# Patient Record
Sex: FEMALE | Race: BLACK OR AFRICAN AMERICAN | NOT HISPANIC OR LATINO | Employment: OTHER | URBAN - METROPOLITAN AREA
[De-identification: names, ages, dates, MRNs, and addresses within clinical notes are randomized per-mention and may not be internally consistent; named-entity substitution may affect disease eponyms.]

---

## 2023-04-29 DIAGNOSIS — I10 ESSENTIAL HYPERTENSION: Primary | ICD-10-CM

## 2023-05-02 PROBLEM — F03.918 DEMENTIA WITH BEHAVIORAL DISTURBANCE (MULTI): Status: ACTIVE | Noted: 2023-05-02

## 2023-05-02 PROBLEM — G89.29 CHRONIC PAIN OF RIGHT KNEE: Status: ACTIVE | Noted: 2023-05-02

## 2023-05-02 PROBLEM — N64.4 BREAST PAIN IN FEMALE: Status: ACTIVE | Noted: 2023-05-02

## 2023-05-02 PROBLEM — M79.604 PAIN OF RIGHT LOWER EXTREMITY: Status: ACTIVE | Noted: 2023-05-02

## 2023-05-02 PROBLEM — F41.9 ANXIETY: Status: ACTIVE | Noted: 2023-05-02

## 2023-05-02 PROBLEM — M62.838 NECK MUSCLE SPASM: Status: ACTIVE | Noted: 2023-05-02

## 2023-05-02 PROBLEM — M79.662 PAIN OF LEFT LOWER LEG: Status: ACTIVE | Noted: 2023-05-02

## 2023-05-02 PROBLEM — M54.9 PAIN OF BACK AND LEFT LOWER EXTREMITY: Status: ACTIVE | Noted: 2023-05-02

## 2023-05-02 PROBLEM — G89.29 NECK PAIN, CHRONIC: Status: ACTIVE | Noted: 2023-05-02

## 2023-05-02 PROBLEM — M25.551 HIP PAIN, RIGHT: Status: ACTIVE | Noted: 2023-05-02

## 2023-05-02 PROBLEM — I50.9 CHF (CONGESTIVE HEART FAILURE) (MULTI): Status: ACTIVE | Noted: 2023-05-02

## 2023-05-02 PROBLEM — D64.9 ANEMIA: Status: ACTIVE | Noted: 2023-05-02

## 2023-05-02 PROBLEM — M54.50 LOW BACK PAIN RADIATING TO RIGHT LEG: Status: ACTIVE | Noted: 2023-05-02

## 2023-05-02 PROBLEM — K21.9 GERD (GASTROESOPHAGEAL REFLUX DISEASE): Status: ACTIVE | Noted: 2023-05-02

## 2023-05-02 PROBLEM — K59.09 CONSTIPATION, CHRONIC: Status: ACTIVE | Noted: 2023-05-02

## 2023-05-02 PROBLEM — M54.2 NECK PAIN, CHRONIC: Status: ACTIVE | Noted: 2023-05-02

## 2023-05-02 PROBLEM — M25.561 CHRONIC PAIN OF RIGHT KNEE: Status: ACTIVE | Noted: 2023-05-02

## 2023-05-02 PROBLEM — R41.3 MEMORY DEFICITS: Status: ACTIVE | Noted: 2023-05-02

## 2023-05-02 PROBLEM — M79.604 LOW BACK PAIN RADIATING TO RIGHT LEG: Status: ACTIVE | Noted: 2023-05-02

## 2023-05-02 PROBLEM — M79.89 SWELLING OF LOWER EXTREMITY: Status: ACTIVE | Noted: 2023-05-02

## 2023-05-02 PROBLEM — N20.0 MULTIPLE KIDNEY STONES: Status: ACTIVE | Noted: 2023-05-02

## 2023-05-02 PROBLEM — I10 ESSENTIAL HYPERTENSION: Status: ACTIVE | Noted: 2023-05-02

## 2023-05-02 PROBLEM — G44.009: Status: ACTIVE | Noted: 2023-05-02

## 2023-05-02 PROBLEM — R30.0 DYSURIA: Status: ACTIVE | Noted: 2023-05-02

## 2023-05-02 PROBLEM — M79.641 PAIN OF RIGHT HAND: Status: ACTIVE | Noted: 2023-05-02

## 2023-05-02 PROBLEM — M19.90 OSTEOARTHRITIS: Status: ACTIVE | Noted: 2023-05-02

## 2023-05-02 PROBLEM — E55.9 VITAMIN D DEFICIENCY: Status: ACTIVE | Noted: 2023-05-02

## 2023-05-02 PROBLEM — M79.671 RIGHT FOOT PAIN: Status: ACTIVE | Noted: 2023-05-02

## 2023-05-02 PROBLEM — N39.0 UTI (URINARY TRACT INFECTION): Status: ACTIVE | Noted: 2023-05-02

## 2023-05-02 PROBLEM — R07.89 CHEST PAIN, ATYPICAL: Status: ACTIVE | Noted: 2023-05-02

## 2023-05-02 PROBLEM — H61.20 CERUMEN IMPACTION: Status: ACTIVE | Noted: 2023-05-02

## 2023-05-02 PROBLEM — R51.9 HEADACHE, WORSENING: Status: ACTIVE | Noted: 2023-05-02

## 2023-05-02 PROBLEM — M79.605 PAIN OF BACK AND LEFT LOWER EXTREMITY: Status: ACTIVE | Noted: 2023-05-02

## 2023-05-02 RX ORDER — CLONIDINE 0.3 MG/24H
PATCH, EXTENDED RELEASE TRANSDERMAL
Qty: 12 PATCH | Refills: 1 | Status: SHIPPED | OUTPATIENT
Start: 2023-05-02 | End: 2023-10-24 | Stop reason: SDUPTHER

## 2023-05-02 RX ORDER — POLYETHYLENE GLYCOL 3350 17 G/17G
POWDER, FOR SOLUTION ORAL
COMMUNITY
End: 2023-05-30 | Stop reason: WASHOUT

## 2023-05-02 RX ORDER — FUROSEMIDE 20 MG/1
1 TABLET ORAL DAILY
COMMUNITY
Start: 2022-06-16 | End: 2023-05-30 | Stop reason: WASHOUT

## 2023-05-02 RX ORDER — CLONIDINE HYDROCHLORIDE 0.1 MG/1
TABLET, EXTENDED RELEASE ORAL
COMMUNITY
Start: 2022-06-23 | End: 2023-05-30 | Stop reason: WASHOUT

## 2023-05-02 RX ORDER — LACTULOSE 10 G/15ML
10 SOLUTION ORAL; RECTAL DAILY
COMMUNITY
Start: 2023-03-02 | End: 2023-05-10

## 2023-05-02 RX ORDER — CLONIDINE 0.3 MG/24H
PATCH, EXTENDED RELEASE TRANSDERMAL
COMMUNITY
End: 2023-05-30 | Stop reason: SDUPTHER

## 2023-05-02 RX ORDER — ALPRAZOLAM 0.5 MG/1
TABLET ORAL
COMMUNITY
End: 2023-05-30 | Stop reason: SDUPTHER

## 2023-05-02 RX ORDER — VIT C/B6/B5/MAGNESIUM/HERB 173 50-5-6-5MG
1 CAPSULE ORAL DAILY
COMMUNITY
End: 2023-05-30 | Stop reason: WASHOUT

## 2023-05-07 DIAGNOSIS — K76.9 LIVER DISEASE: Primary | ICD-10-CM

## 2023-05-10 RX ORDER — LACTULOSE 10 G/15ML
SOLUTION ORAL; RECTAL
Qty: 237 ML | Refills: 0 | Status: SHIPPED | OUTPATIENT
Start: 2023-05-10 | End: 2023-05-30 | Stop reason: SDUPTHER

## 2023-05-30 ENCOUNTER — OFFICE VISIT (OUTPATIENT)
Dept: PRIMARY CARE | Facility: CLINIC | Age: 88
End: 2023-05-30
Payer: MEDICARE

## 2023-05-30 ENCOUNTER — LAB (OUTPATIENT)
Dept: LAB | Facility: LAB | Age: 88
End: 2023-05-30
Payer: MEDICARE

## 2023-05-30 VITALS — BODY MASS INDEX: 27.62 KG/M2 | SYSTOLIC BLOOD PRESSURE: 132 MMHG | WEIGHT: 166 LBS | DIASTOLIC BLOOD PRESSURE: 80 MMHG

## 2023-05-30 DIAGNOSIS — E55.9 VITAMIN D DEFICIENCY: ICD-10-CM

## 2023-05-30 DIAGNOSIS — R73.9 HYPERGLYCEMIA: ICD-10-CM

## 2023-05-30 DIAGNOSIS — K76.9 LIVER DISEASE: ICD-10-CM

## 2023-05-30 DIAGNOSIS — F03.918 DEMENTIA WITH BEHAVIORAL DISTURBANCE (MULTI): ICD-10-CM

## 2023-05-30 DIAGNOSIS — K59.09 CONSTIPATION, CHRONIC: ICD-10-CM

## 2023-05-30 DIAGNOSIS — F41.9 ANXIETY: Primary | ICD-10-CM

## 2023-05-30 DIAGNOSIS — I10 ESSENTIAL HYPERTENSION: ICD-10-CM

## 2023-05-30 DIAGNOSIS — D64.9 ANEMIA, UNSPECIFIED TYPE: ICD-10-CM

## 2023-05-30 DIAGNOSIS — G89.29 CHRONIC PAIN OF RIGHT KNEE: ICD-10-CM

## 2023-05-30 DIAGNOSIS — M25.561 CHRONIC PAIN OF RIGHT KNEE: ICD-10-CM

## 2023-05-30 LAB
ALANINE AMINOTRANSFERASE (SGPT) (U/L) IN SER/PLAS: 5 U/L (ref 7–45)
ALBUMIN (G/DL) IN SER/PLAS: 3.6 G/DL (ref 3.4–5)
ALKALINE PHOSPHATASE (U/L) IN SER/PLAS: 75 U/L (ref 33–136)
ANION GAP IN SER/PLAS: 7 MMOL/L (ref 10–20)
ASPARTATE AMINOTRANSFERASE (SGOT) (U/L) IN SER/PLAS: 15 U/L (ref 9–39)
BASOPHILS (10*3/UL) IN BLOOD BY AUTOMATED COUNT: 0.01 X10E9/L (ref 0–0.1)
BASOPHILS/100 LEUKOCYTES IN BLOOD BY AUTOMATED COUNT: 0.2 % (ref 0–2)
BILIRUBIN TOTAL (MG/DL) IN SER/PLAS: 1.3 MG/DL (ref 0–1.2)
CALCIDIOL (25 OH VITAMIN D3) (NG/ML) IN SER/PLAS: 31 NG/ML
CALCIUM (MG/DL) IN SER/PLAS: 9.3 MG/DL (ref 8.6–10.6)
CARBON DIOXIDE, TOTAL (MMOL/L) IN SER/PLAS: 32 MMOL/L (ref 21–32)
CHLORIDE (MMOL/L) IN SER/PLAS: 110 MMOL/L (ref 98–107)
CHOLESTEROL (MG/DL) IN SER/PLAS: 152 MG/DL (ref 0–199)
CHOLESTEROL IN HDL (MG/DL) IN SER/PLAS: 61.4 MG/DL
CHOLESTEROL/HDL RATIO: 2.5
COBALAMIN (VITAMIN B12) (PG/ML) IN SER/PLAS: 320 PG/ML (ref 211–911)
CREATININE (MG/DL) IN SER/PLAS: 0.96 MG/DL (ref 0.5–1.05)
EOSINOPHILS (10*3/UL) IN BLOOD BY AUTOMATED COUNT: 0.02 X10E9/L (ref 0–0.4)
EOSINOPHILS/100 LEUKOCYTES IN BLOOD BY AUTOMATED COUNT: 0.4 % (ref 0–6)
ERYTHROCYTE DISTRIBUTION WIDTH (RATIO) BY AUTOMATED COUNT: 14.3 % (ref 11.5–14.5)
ERYTHROCYTE MEAN CORPUSCULAR HEMOGLOBIN CONCENTRATION (G/DL) BY AUTOMATED: 30.6 G/DL (ref 32–36)
ERYTHROCYTE MEAN CORPUSCULAR VOLUME (FL) BY AUTOMATED COUNT: 91 FL (ref 80–100)
ERYTHROCYTES (10*6/UL) IN BLOOD BY AUTOMATED COUNT: 3.89 X10E12/L (ref 4–5.2)
ESTIMATED AVERAGE GLUCOSE FOR HBA1C: 117 MG/DL
FERRITIN (UG/LL) IN SER/PLAS: 109 UG/L (ref 8–150)
GFR FEMALE: 56 ML/MIN/1.73M2
GLUCOSE (MG/DL) IN SER/PLAS: 96 MG/DL (ref 74–99)
HEMATOCRIT (%) IN BLOOD BY AUTOMATED COUNT: 35.3 % (ref 36–46)
HEMOGLOBIN (G/DL) IN BLOOD: 10.8 G/DL (ref 12–16)
HEMOGLOBIN A1C/HEMOGLOBIN TOTAL IN BLOOD: 5.7 %
IMMATURE GRANULOCYTES/100 LEUKOCYTES IN BLOOD BY AUTOMATED COUNT: 0.2 % (ref 0–0.9)
IRON (UG/DL) IN SER/PLAS: 39 UG/DL (ref 35–150)
IRON BINDING CAPACITY (UG/DL) IN SER/PLAS: 259 UG/DL (ref 240–445)
IRON SATURATION (%) IN SER/PLAS: 15 % (ref 25–45)
LDL: 78 MG/DL (ref 0–99)
LEUKOCYTES (10*3/UL) IN BLOOD BY AUTOMATED COUNT: 5.7 X10E9/L (ref 4.4–11.3)
LYMPHOCYTES (10*3/UL) IN BLOOD BY AUTOMATED COUNT: 1.37 X10E9/L (ref 0.8–3)
LYMPHOCYTES/100 LEUKOCYTES IN BLOOD BY AUTOMATED COUNT: 24 % (ref 13–44)
MONOCYTES (10*3/UL) IN BLOOD BY AUTOMATED COUNT: 0.52 X10E9/L (ref 0.05–0.8)
MONOCYTES/100 LEUKOCYTES IN BLOOD BY AUTOMATED COUNT: 9.1 % (ref 2–10)
NEUTROPHILS (10*3/UL) IN BLOOD BY AUTOMATED COUNT: 3.77 X10E9/L (ref 1.6–5.5)
NEUTROPHILS/100 LEUKOCYTES IN BLOOD BY AUTOMATED COUNT: 66.1 % (ref 40–80)
NRBC (PER 100 WBCS) BY AUTOMATED COUNT: 0 /100 WBC (ref 0–0)
PLATELETS (10*3/UL) IN BLOOD AUTOMATED COUNT: 175 X10E9/L (ref 150–450)
POTASSIUM (MMOL/L) IN SER/PLAS: 3.5 MMOL/L (ref 3.5–5.3)
PROTEIN TOTAL: 6.7 G/DL (ref 6.4–8.2)
SODIUM (MMOL/L) IN SER/PLAS: 145 MMOL/L (ref 136–145)
THYROTROPIN (MIU/L) IN SER/PLAS BY DETECTION LIMIT <= 0.05 MIU/L: 1.76 MIU/L (ref 0.44–3.98)
TRIGLYCERIDE (MG/DL) IN SER/PLAS: 62 MG/DL (ref 0–149)
UREA NITROGEN (MG/DL) IN SER/PLAS: 12 MG/DL (ref 6–23)
VLDL: 12 MG/DL (ref 0–40)

## 2023-05-30 PROCEDURE — 84443 ASSAY THYROID STIM HORMONE: CPT

## 2023-05-30 PROCEDURE — 82728 ASSAY OF FERRITIN: CPT

## 2023-05-30 PROCEDURE — 83540 ASSAY OF IRON: CPT

## 2023-05-30 PROCEDURE — 82607 VITAMIN B-12: CPT

## 2023-05-30 PROCEDURE — 82306 VITAMIN D 25 HYDROXY: CPT

## 2023-05-30 PROCEDURE — 3079F DIAST BP 80-89 MM HG: CPT | Performed by: INTERNAL MEDICINE

## 2023-05-30 PROCEDURE — 1160F RVW MEDS BY RX/DR IN RCRD: CPT | Performed by: INTERNAL MEDICINE

## 2023-05-30 PROCEDURE — 1036F TOBACCO NON-USER: CPT | Performed by: INTERNAL MEDICINE

## 2023-05-30 PROCEDURE — 83550 IRON BINDING TEST: CPT

## 2023-05-30 PROCEDURE — 83036 HEMOGLOBIN GLYCOSYLATED A1C: CPT

## 2023-05-30 PROCEDURE — 80061 LIPID PANEL: CPT

## 2023-05-30 PROCEDURE — 99214 OFFICE O/P EST MOD 30 MIN: CPT | Performed by: INTERNAL MEDICINE

## 2023-05-30 PROCEDURE — 85025 COMPLETE CBC W/AUTO DIFF WBC: CPT

## 2023-05-30 PROCEDURE — 36415 COLL VENOUS BLD VENIPUNCTURE: CPT

## 2023-05-30 PROCEDURE — 80053 COMPREHEN METABOLIC PANEL: CPT

## 2023-05-30 PROCEDURE — 3075F SYST BP GE 130 - 139MM HG: CPT | Performed by: INTERNAL MEDICINE

## 2023-05-30 PROCEDURE — 1159F MED LIST DOCD IN RCRD: CPT | Performed by: INTERNAL MEDICINE

## 2023-05-30 RX ORDER — LACTULOSE 10 G/15ML
10 SOLUTION ORAL; RECTAL DAILY
Qty: 237 ML | Refills: 1 | Status: SHIPPED | OUTPATIENT
Start: 2023-05-30 | End: 2024-04-16 | Stop reason: WASHOUT

## 2023-05-30 RX ORDER — ACETAMINOPHEN 500 MG
1000 TABLET ORAL EVERY 8 HOURS PRN
Qty: 540 TABLET | Refills: 1 | Status: SHIPPED | OUTPATIENT
Start: 2023-05-30 | End: 2023-11-26

## 2023-05-30 RX ORDER — ALPRAZOLAM 0.5 MG/1
0.5 TABLET ORAL 2 TIMES DAILY PRN
Qty: 60 TABLET | Refills: 0 | Status: SHIPPED | OUTPATIENT
Start: 2023-05-30 | End: 2024-04-16 | Stop reason: WASHOUT

## 2023-05-30 ASSESSMENT — ENCOUNTER SYMPTOMS
LOSS OF SENSATION IN FEET: 1
DEPRESSION: 0
OCCASIONAL FEELINGS OF UNSTEADINESS: 1

## 2023-05-30 NOTE — PROGRESS NOTES
Subjective   Patient ID: Umu Mckeon is a 89 y.o. female who presents for Med Refill.    Med Refill      BP running high, but feeling anxious  No HA, SOB, chest pain  Due for labs  Not drinking enough fluids, per daughter      Review of Systems   All other systems reviewed and are negative.      /80   Wt 75.3 kg (166 lb)   BMI 27.62 kg/m²      Objective   Physical Exam  Constitutional:       General: She is not in acute distress.     Comments: Elderly   Cardiovascular:      Rate and Rhythm: Normal rate and regular rhythm.   Pulmonary:      Effort: Pulmonary effort is normal.      Breath sounds: Normal breath sounds.   Musculoskeletal:      Cervical back: Neck supple.   Neurological:      Mental Status: She is alert.   Psychiatric:         Mood and Affect: Mood normal.         Behavior: Behavior normal.      Comments: Forgetful during visit, repeated herself in conversation         Assessment/Plan     Problem List Items Addressed This Visit          Nervous    Dementia with behavioral disturbance (CMS/HCC)     Encouraged to follow back up with Geriatrics            Circulatory    Essential hypertension    Relevant Orders    Comprehensive Metabolic Panel    Lipid Panel    TSH with reflex to Free T4 if abnormal       Digestive    Constipation, chronic     Refilled lactulose            Musculoskeletal    Chronic pain of right knee    Relevant Medications    acetaminophen (Tylenol Extra Strength) 500 mg tablet       Endocrine/Metabolic    Vitamin D deficiency    Relevant Orders    Vitamin D, Total       Hematologic    Anemia    Relevant Orders    CBC and Auto Differential    Ferritin    Iron and TIBC    Vitamin B12       Other    Anxiety - Primary    Relevant Medications    ALPRAZolam (Xanax) 0.5 mg tablet     Other Visit Diagnoses       Liver disease        Relevant Medications    lactulose 20 gram/30 mL oral solution    Hyperglycemia        Relevant Orders    Hemoglobin A1C

## 2023-06-01 DIAGNOSIS — D64.9 ANEMIA, UNSPECIFIED TYPE: Primary | ICD-10-CM

## 2023-09-19 ENCOUNTER — TELEPHONE (OUTPATIENT)
Dept: PRIMARY CARE | Facility: CLINIC | Age: 88
End: 2023-09-19
Payer: MEDICARE

## 2023-09-26 ENCOUNTER — TELEPHONE (OUTPATIENT)
Dept: PRIMARY CARE | Facility: CLINIC | Age: 88
End: 2023-09-26
Payer: MEDICARE

## 2023-10-24 DIAGNOSIS — I10 ESSENTIAL HYPERTENSION: ICD-10-CM

## 2023-10-24 RX ORDER — CLONIDINE 0.3 MG/24H
PATCH, EXTENDED RELEASE TRANSDERMAL
Qty: 12 PATCH | Refills: 0 | Status: SHIPPED | OUTPATIENT
Start: 2023-10-24 | End: 2023-11-28 | Stop reason: SINTOL

## 2023-10-25 ENCOUNTER — APPOINTMENT (OUTPATIENT)
Dept: HEMATOLOGY/ONCOLOGY | Facility: CLINIC | Age: 88
End: 2023-10-25
Payer: MEDICARE

## 2023-11-16 ENCOUNTER — APPOINTMENT (OUTPATIENT)
Dept: PRIMARY CARE | Facility: CLINIC | Age: 88
End: 2023-11-16
Payer: MEDICARE

## 2023-11-28 ENCOUNTER — TELEMEDICINE (OUTPATIENT)
Dept: PRIMARY CARE | Facility: CLINIC | Age: 88
End: 2023-11-28
Payer: MEDICARE

## 2023-11-28 DIAGNOSIS — I10 ESSENTIAL HYPERTENSION: Primary | ICD-10-CM

## 2023-11-28 DIAGNOSIS — Z78.0 ASYMPTOMATIC POSTMENOPAUSAL STATE: ICD-10-CM

## 2023-11-28 DIAGNOSIS — Z13.820 SCREENING FOR OSTEOPOROSIS: ICD-10-CM

## 2023-11-28 PROCEDURE — 99214 OFFICE O/P EST MOD 30 MIN: CPT | Performed by: INTERNAL MEDICINE

## 2023-11-28 RX ORDER — OLMESARTAN MEDOXOMIL-HYDROCHLOROTHIAZIDE 40; 25 MG/1; MG/1
1 TABLET, FILM COATED ORAL DAILY
Qty: 30 TABLET | Refills: 0 | Status: SHIPPED | OUTPATIENT
Start: 2023-11-28 | End: 2024-01-09 | Stop reason: SDUPTHER

## 2023-11-28 NOTE — PROGRESS NOTES
Subjective   Patient ID: Umu Mckeon is a 89 y.o. female who presents via virtual visit for Follow-up.  An interactive audio and video telecommunication system which permits real time communications between the patient (at the originating site) and provider (at the distant site) was utilized to provide this telehealth service.    Verbal consent was requested and obtained from the patient on the day of encounter.    HPI  Pt presents for a virtual visit with her and her daughter.  Daughter has concerns about her using a clonidine patch - feels it is affecting her mental status  BP is still running high with the patch       Review of Systems   All other systems reviewed and are negative.      Objective   Physical Exam  Constitutional:       General: She is awake.      Appearance: Normal appearance. She is well-developed.   Neurological:      Mental Status: She is alert.   Psychiatric:         Mood and Affect: Mood normal.         Behavior: Behavior normal.         Assessment/Plan     Problem List Items Addressed This Visit       Essential hypertension - Primary    Relevant Medications    Benicar HCT 40-25 mg tablet     Other Visit Diagnoses       Screening for osteoporosis        Relevant Orders    XR DEXA bone density    Asymptomatic postmenopausal state        Relevant Orders    XR DEXA bone density

## 2023-12-27 ENCOUNTER — APPOINTMENT (OUTPATIENT)
Dept: BEHAVIORAL HEALTH | Facility: CLINIC | Age: 88
End: 2023-12-27
Payer: MEDICARE

## 2023-12-27 PROBLEM — J06.9 ACUTE URI: Status: ACTIVE | Noted: 2023-12-27

## 2023-12-27 PROBLEM — Z91.148 MEDICATION NONADHERENCE DUE TO INTOLERANCE: Status: ACTIVE | Noted: 2023-12-27

## 2023-12-27 PROBLEM — R35.0 URINARY FREQUENCY: Status: ACTIVE | Noted: 2023-12-27

## 2023-12-27 PROBLEM — N21.0 STONE, BLADDER, DIVERTICULUM: Status: ACTIVE | Noted: 2023-12-27

## 2023-12-27 RX ORDER — DOCUSATE SODIUM 100 MG/1
100 CAPSULE, LIQUID FILLED ORAL EVERY 12 HOURS PRN
COMMUNITY
Start: 2012-01-10 | End: 2024-04-16 | Stop reason: WASHOUT

## 2023-12-27 RX ORDER — ATENOLOL 25 MG/1
TABLET ORAL
COMMUNITY
End: 2024-04-16 | Stop reason: WASHOUT

## 2023-12-27 RX ORDER — AMLODIPINE BESYLATE 10 MG/1
TABLET ORAL
COMMUNITY
End: 2024-04-16 | Stop reason: WASHOUT

## 2023-12-27 RX ORDER — DEXTROMETHORPHAN HYDROBROMIDE, GUAIFENESIN 5; 100 MG/5ML; MG/5ML
LIQUID ORAL
COMMUNITY

## 2023-12-27 RX ORDER — ASCORBIC ACID 125 MG
TABLET,CHEWABLE ORAL
COMMUNITY
Start: 2021-03-11 | End: 2024-04-16 | Stop reason: WASHOUT

## 2023-12-27 RX ORDER — POTASSIUM CHLORIDE 1.5 G/1.58G
POWDER, FOR SOLUTION ORAL
COMMUNITY
End: 2024-04-16 | Stop reason: WASHOUT

## 2023-12-27 RX ORDER — MEMANTINE HYDROCHLORIDE 5 MG/1
TABLET ORAL
COMMUNITY
Start: 2023-09-27 | End: 2024-04-16 | Stop reason: WASHOUT

## 2023-12-27 RX ORDER — ALPRAZOLAM 0.25 MG/1
TABLET ORAL
COMMUNITY
End: 2024-04-16 | Stop reason: WASHOUT

## 2023-12-27 RX ORDER — POLYETHYLENE GLYCOL 3350 17 G/17G
1 POWDER, FOR SOLUTION ORAL DAILY
COMMUNITY
Start: 2022-11-29 | End: 2024-04-16 | Stop reason: WASHOUT

## 2023-12-27 RX ORDER — BISACODYL 5 MG
TABLET, DELAYED RELEASE (ENTERIC COATED) ORAL
COMMUNITY
End: 2024-04-16 | Stop reason: WASHOUT

## 2023-12-30 PROBLEM — N28.9 RENAL LESION: Status: ACTIVE | Noted: 2023-12-30

## 2023-12-30 PROBLEM — R07.9 CHEST PAIN: Status: ACTIVE | Noted: 2023-12-30

## 2023-12-30 PROBLEM — F03.90 DEMENTIA (MULTI): Status: ACTIVE | Noted: 2023-12-30

## 2023-12-30 PROBLEM — N20.0 RIGHT RENAL STONE: Status: ACTIVE | Noted: 2023-12-30

## 2023-12-30 PROBLEM — I10 HTN (HYPERTENSION): Status: ACTIVE | Noted: 2023-12-30

## 2024-01-03 ENCOUNTER — APPOINTMENT (OUTPATIENT)
Dept: BEHAVIORAL HEALTH | Facility: CLINIC | Age: 89
End: 2024-01-03
Payer: MEDICARE

## 2024-01-09 DIAGNOSIS — I10 ESSENTIAL HYPERTENSION: ICD-10-CM

## 2024-01-09 RX ORDER — OLMESARTAN MEDOXOMIL-HYDROCHLOROTHIAZIDE 40; 25 MG/1; MG/1
1 TABLET, FILM COATED ORAL DAILY
Qty: 30 TABLET | Refills: 2 | Status: SHIPPED | OUTPATIENT
Start: 2024-01-09 | End: 2024-04-11

## 2024-03-12 ENCOUNTER — HOSPITAL ENCOUNTER (OUTPATIENT)
Dept: RADIOLOGY | Facility: CLINIC | Age: 89
Discharge: HOME | End: 2024-03-12
Payer: MEDICARE

## 2024-03-12 DIAGNOSIS — Z13.820 SCREENING FOR OSTEOPOROSIS: ICD-10-CM

## 2024-03-12 DIAGNOSIS — Z78.0 ASYMPTOMATIC POSTMENOPAUSAL STATE: ICD-10-CM

## 2024-03-12 PROCEDURE — 77080 DXA BONE DENSITY AXIAL: CPT | Performed by: RADIOLOGY

## 2024-03-12 PROCEDURE — 77080 DXA BONE DENSITY AXIAL: CPT

## 2024-03-28 ENCOUNTER — APPOINTMENT (OUTPATIENT)
Dept: PRIMARY CARE | Facility: CLINIC | Age: 89
End: 2024-03-28
Payer: MEDICARE

## 2024-04-02 ENCOUNTER — APPOINTMENT (OUTPATIENT)
Dept: PRIMARY CARE | Facility: CLINIC | Age: 89
End: 2024-04-02
Payer: MEDICARE

## 2024-04-10 DIAGNOSIS — I10 ESSENTIAL HYPERTENSION: ICD-10-CM

## 2024-04-11 RX ORDER — OLMESARTAN MEDOXOMIL-HYDROCHLOROTHIAZIDE 40; 25 MG/1; MG/1
1 TABLET, FILM COATED ORAL DAILY
Qty: 30 TABLET | Refills: 3 | Status: SHIPPED | OUTPATIENT
Start: 2024-04-11 | End: 2024-04-16 | Stop reason: DRUGHIGH

## 2024-04-16 ENCOUNTER — OFFICE VISIT (OUTPATIENT)
Dept: PRIMARY CARE | Facility: CLINIC | Age: 89
End: 2024-04-16
Payer: MEDICARE

## 2024-04-16 VITALS — WEIGHT: 137 LBS | DIASTOLIC BLOOD PRESSURE: 63 MMHG | BODY MASS INDEX: 22.8 KG/M2 | SYSTOLIC BLOOD PRESSURE: 96 MMHG

## 2024-04-16 DIAGNOSIS — E44.0 PROTEIN-CALORIE MALNUTRITION, MODERATE (MULTI): ICD-10-CM

## 2024-04-16 DIAGNOSIS — F32.A DEPRESSION, UNSPECIFIED DEPRESSION TYPE: ICD-10-CM

## 2024-04-16 DIAGNOSIS — I50.9 CHRONIC CONGESTIVE HEART FAILURE, UNSPECIFIED HEART FAILURE TYPE (MULTI): ICD-10-CM

## 2024-04-16 DIAGNOSIS — R63.0 DECREASED APPETITE: ICD-10-CM

## 2024-04-16 DIAGNOSIS — Z72.0 TOBACCO USE: Primary | ICD-10-CM

## 2024-04-16 DIAGNOSIS — F03.918 DEMENTIA WITH BEHAVIORAL DISTURBANCE (MULTI): ICD-10-CM

## 2024-04-16 DIAGNOSIS — I10 ESSENTIAL HYPERTENSION: ICD-10-CM

## 2024-04-16 PROCEDURE — 1158F ADVNC CARE PLAN TLK DOCD: CPT | Performed by: INTERNAL MEDICINE

## 2024-04-16 PROCEDURE — G0439 PPPS, SUBSEQ VISIT: HCPCS | Performed by: INTERNAL MEDICINE

## 2024-04-16 PROCEDURE — 1160F RVW MEDS BY RX/DR IN RCRD: CPT | Performed by: INTERNAL MEDICINE

## 2024-04-16 PROCEDURE — 99214 OFFICE O/P EST MOD 30 MIN: CPT | Performed by: INTERNAL MEDICINE

## 2024-04-16 PROCEDURE — 1159F MED LIST DOCD IN RCRD: CPT | Performed by: INTERNAL MEDICINE

## 2024-04-16 PROCEDURE — 1123F ACP DISCUSS/DSCN MKR DOCD: CPT | Performed by: INTERNAL MEDICINE

## 2024-04-16 PROCEDURE — 1170F FXNL STATUS ASSESSED: CPT | Performed by: INTERNAL MEDICINE

## 2024-04-16 PROCEDURE — 3074F SYST BP LT 130 MM HG: CPT | Performed by: INTERNAL MEDICINE

## 2024-04-16 PROCEDURE — 3078F DIAST BP <80 MM HG: CPT | Performed by: INTERNAL MEDICINE

## 2024-04-16 RX ORDER — OLMESARTAN MEDOXOMIL-HYDROCHLOROTHIAZIDE 20; 12.5 MG/1; MG/1
1 TABLET, FILM COATED ORAL DAILY
Qty: 90 TABLET | Refills: 1 | Status: SHIPPED | OUTPATIENT
Start: 2024-04-16 | End: 2024-10-13

## 2024-04-16 RX ORDER — MIRTAZAPINE 7.5 MG/1
7.5 TABLET, FILM COATED ORAL NIGHTLY
Qty: 30 TABLET | Refills: 2 | Status: SHIPPED | OUTPATIENT
Start: 2024-04-16 | End: 2024-07-15

## 2024-04-16 ASSESSMENT — ACTIVITIES OF DAILY LIVING (ADL)
BATHING: INDEPENDENT
DRESSING: INDEPENDENT
MANAGING_FINANCES: NEEDS ASSISTANCE
TAKING_MEDICATION: NEEDS ASSISTANCE
DOING_HOUSEWORK: NEEDS ASSISTANCE
GROCERY_SHOPPING: NEEDS ASSISTANCE

## 2024-04-16 ASSESSMENT — PATIENT HEALTH QUESTIONNAIRE - PHQ9
10. IF YOU CHECKED OFF ANY PROBLEMS, HOW DIFFICULT HAVE THESE PROBLEMS MADE IT FOR YOU TO DO YOUR WORK, TAKE CARE OF THINGS AT HOME, OR GET ALONG WITH OTHER PEOPLE: SOMEWHAT DIFFICULT
2. FEELING DOWN, DEPRESSED OR HOPELESS: SEVERAL DAYS
1. LITTLE INTEREST OR PLEASURE IN DOING THINGS: SEVERAL DAYS
SUM OF ALL RESPONSES TO PHQ9 QUESTIONS 1 AND 2: 2

## 2024-04-16 ASSESSMENT — ENCOUNTER SYMPTOMS
OCCASIONAL FEELINGS OF UNSTEADINESS: 0
LOSS OF SENSATION IN FEET: 0
DEPRESSION: 0

## 2024-04-16 NOTE — PROGRESS NOTES
"Primary care office Note      Name: Umu Mckeon, Age: 89 y.o., Gender: female, MRN: [id number]   Pharmacy:   [id number] - [place], [place] - [address]  [address] [address]  Phone: [phone] Fax: [phone]     PCP: Gabriela Palmer        Subjective:   Chief Complaint   Patient presents with    Medicare Annual Wellness Visit Subsequent    Follow-up        Umu Mckeon is a 89 y.o. female who presented to the clinic today for follow up and medicare wellness visit     HPI:   Umu Mckeon is a 89 y.o. female presents today in office with her daughter Nella presents. Daughter is concerned about weight loss and lack of patient's appetite. She does not eat much and has always been a picky eater. She was drinking boost shakes once daily, but not longer is doing this because she doesn't like milk and these are too \"Milky\" for her.   Daughter is also worried because she is very anxious all of the time, but has not tolerated any medication for it. Family has to avoid all highways when they are transporting her because pt [place].   Daughter also notes paranoia and always accusing people of stealing her things. In reality she is misplacing things and does not remember where she is putting them.  They said geriatrics and [facility] in the past and daughter did not feel is was helpful.  It was medically necessary for patient to have in home care and an aide to help the family with respite care.    Medicare Wellness  The patient is being seen for the subsequent annual wellness visit and follow up.  Past Medical, Surgical and Family History: reviewed and updated in chart.   Interval History: Patient has not been hospitalized previously.   Medications and Supplements: Review of all medications by a prescribing practitioner or clinical pharmacist (such as prescriptions, OTCs, herbal therapies and supplements) documented in the medical record.  "   No, the patient is not using opioids.   Health Risk Assessment:. Paper HRA completed by patient and scanned into chart.   Depression/Suicide Screening:  .   Done  No falls in the past year.  No recent hospitalizations.  Advance care planning completed.     The patient denies headaches, lightheadedness, changes in speech/vision, photophobia, dysphagia, diaphoresis, Chest pain, dyspnea, Abdominal pain, recent falls or trauma, and changes in bowel/urinary habits.     ROS:   12 points review of system is negative excepted as stated above in the HPI.    Medical History      PMH:    has a past medical history of Personal history of other diseases of the circulatory system and Personal history of other diseases of the musculoskeletal system and connective tissue.   Allergies:   Allergies   Allergen Reactions    Aspirin Unknown    Butorphanol Unknown    Coconut Unknown    Coconut Oil Hives    Codeine Unknown    Cranberry Hives    Diltiazem Unknown    Diltiazem Hcl Unknown    Grape Hives    Hydralazine Unknown    Iodinated Contrast Media Unknown    Metoprolol Unknown    Paroxetine Hcl Unknown    Sertraline Unknown    Sulfa (Sulfonamide Antibiotics) Unknown      Surgical Hx:   Past Surgical History:   Procedure Laterality Date    OTHER SURGICAL HISTORY  2020     section    OTHER SURGICAL HISTORY  2020    Hysterectomy    OTHER SURGICAL HISTORY  2020    Appendectomy      Social HX:   Social History     Tobacco Use    Smoking status: Former     Current packs/day: 0.00     Types: Cigarettes     Quit date:      Years since quittin.3    Smokeless tobacco: Never   Substance Use Topics    Alcohol use: Not Currently    Drug use: Not Currently        MEDS:   Current Outpatient Medications   Medication Instructions    acetaminophen (Tylenol Arthritis Pain) 650 mg ER tablet     Benicar HCT 20-12.5 mg tablet 1 tablet, oral, Daily    food supplemt, lactose-reduced 0.06 gram- 1.5 kcal/mL liquid 1 bottle,  oral, 3 times daily    mirtazapine (REMERON) 7.5 mg, oral, Nightly        Objective Data     Objective:   Visit Vitals  BP 96/63        Physical Examination:   GE; sitting comfortably in a chair, in NAD  HEENT; AT/NC, no conjunctival pallor, anicteric sclera  Neck; Supple neck, no LAD/JVD  Chest; CTAbl, no adventitious sounds  CVS; s1 and s2 only no MGR, RRR  Abd; soft, NT/ND, normoactive BS  Ext; no cyanosis/clubbing/edema, pulses intact  Psych; normal mood, repetitive at times    Last Labs:   CBC:   WBC   Date Value Ref Range Status   07/13/2023 4.9 4.4 - 11.3 x10E9/L Final   05/30/2023 5.7 4.4 - 11.3 x10E9/L Final   09/13/2022 6.6 4.4 - 11.3 x10E9/L Final     Hemoglobin   Date Value Ref Range Status   07/13/2023 10.6 (L) 12.0 - 16.0 g/dL Final   05/30/2023 10.8 (L) 12.0 - 16.0 g/dL Final   09/13/2022 11.4 (L) 12.0 - 16.0 g/dL Final     MCV   Date Value Ref Range Status   07/13/2023 93 80 - 100 fL Final   05/30/2023 91 80 - 100 fL Final   09/13/2022 88 80 - 100 fL Final     Platelets   Date Value Ref Range Status   07/13/2023 189 150 - 450 x10E9/L Final   05/30/2023 175 150 - 450 x10E9/L Final   09/13/2022 178 150 - 450 x10E9/L Final      CMP:   Sodium   Date Value Ref Range Status   07/13/2023 141 136 - 145 mmol/L Final   05/30/2023 145 136 - 145 mmol/L Final   09/13/2022 140 136 - 145 mmol/L Final     Potassium   Date Value Ref Range Status   07/13/2023 3.4 (L) 3.5 - 5.3 mmol/L Final   05/30/2023 3.5 3.5 - 5.3 mmol/L Final   09/13/2022 3.7 3.5 - 5.3 mmol/L Final     Chloride   Date Value Ref Range Status   07/13/2023 106 98 - 107 mmol/L Final   05/30/2023 110 (H) 98 - 107 mmol/L Final   09/13/2022 105 98 - 107 mmol/L Final     Urea Nitrogen   Date Value Ref Range Status   07/13/2023 10 6 - 23 mg/dL Final   05/30/2023 12 6 - 23 mg/dL Final   09/13/2022 11 6 - 23 mg/dL Final     Creatinine   Date Value Ref Range Status   07/13/2023 1.06 (H) 0.50 - 1.05 mg/dL Final   05/30/2023 0.96 0.50 - 1.05 mg/dL Final    09/13/2022 1.02 0.50 - 1.05 mg/dL Final      A1c:   Hemoglobin A1C   Date Value Ref Range Status   05/30/2023 5.7 (A) % Final     Comment:          Diagnosis of Diabetes-Adults   Non-Diabetic: < or = 5.6%   Increased risk for developing diabetes: 5.7-6.4%   Diagnostic of diabetes: > or = 6.5%  .       Monitoring of Diabetes                Age (y)     Therapeutic Goal (%)   Adults:          >18           <7.0   Pediatrics:    13-18           <7.5                   7-12           <8.0                   0- 6            7.5-8.5   American Diabetes Association. Diabetes Care 33(S1), Jan 2010.   09/14/2021 5.7 (A) % Final     Comment:          Diagnosis of Diabetes-Adults   Non-Diabetic: < or = 5.6%   Increased risk for developing diabetes: 5.7-6.4%   Diagnostic of diabetes: > or = 6.5%  .       Monitoring of Diabetes                Age (y)     Therapeutic Goal (%)   Adults:          >18           <7.0   Pediatrics:    13-18           <7.5                   7-12           <8.0                   0- 6            7.5-8.5   American Diabetes Association. Diabetes Care 33(S1), Jan 2010.     06/12/2020 5.6 4.3 - 5.6 % Final     Comment:     American Diabetes Association guidelines indicate that patients with HgbA1c in   the range 5.7-6.4% are at increased risk for development of diabetes, and   intervention by lifestyle modification may be beneficial. HgbA1c greater or   equal to 6.5% is considered diagnostic of diabetes.        Other labs;   Vit D:   Vitamin D, 25-Hydroxy   Date Value Ref Range Status   05/30/2023 31 ng/mL Final     Comment:     .  DEFICIENCY:         < 20   NG/ML  INSUFFICIENCY:      20-29  NG/ML  SUFFICIENCY:         NG/ML    THIS ASSAY ACCURATELY QUANTIFIES THE SUM OF  VITAMIN D3, 25-HYDROXY AND VIT D2,25-HYDROXY.   11/11/2021 27 (A) ng/mL Final     Comment:     .  DEFICIENCY:         < 20   NG/ML  INSUFFICIENCY:      20-29  NG/ML  SUFFICIENCY:         NG/ML    THIS ASSAY ACCURATELY QUANTIFIES  THE SUM OF  VITAMIN D3, 25-HYDROXY AND VIT D2,25-HYDROXY.     11/16/2020 26 (A) ng/mL Final     Comment:     .  DEFICIENCY:         < 20   NG/ML  INSUFFICIENCY:      20-29  NG/ML  SUFFICIENCY:         NG/ML    THIS ASSAY ACCURATELY QUANTIFIES THE SUM OF  VITAMIN D3, 25-HYDROXY AND VIT D2,25-HYDROXY.        TSH:  TSH   Date Value Ref Range Status   07/13/2023 2.66 0.44 - 3.98 mIU/L Final     Comment:      TSH testing is performed using different testing    methodology at [facility] than at other    [facility]. Direct result comparisons should    only be made within the same method.     05/30/2023 1.76 0.44 - 3.98 mIU/L Final     Comment:      TSH testing is performed using different testing    methodology at [facility] than at other    [facility]. Direct result comparisons should    only be made within the same method.   11/11/2021 2.15 0.44 - 3.98 mIU/L Final     Comment:      TSH testing is performed using different testing    methodology at [facility] than at other    [facility]. Direct result comparisons should    only be made within the same method.          Assessment and Plan     Problem List Items Addressed This Visit       Dementia with behavioral disturbance (Multi)    Essential hypertension    Relevant Medications    Benicar HCT 20-12.5 mg tablet     Other Visit Diagnoses       Tobacco use    -  Primary    Decreased appetite        Relevant Medications    mirtazapine (Remeron) 7.5 mg tablet    Other Relevant Orders    Referral to Nutrition Services    Protein-calorie malnutrition, moderate (Multi)        Relevant Orders    Referral to Nutrition Services    Depression, unspecified depression type        Relevant Medications    mirtazapine (Remeron) 7.5 mg tablet            Gabriela Palmer DO

## 2024-04-16 NOTE — PROGRESS NOTES
Subjective   Subjective:     History Of Present Illness:  Umu Mckeon is a 89 y.o. female with a PMH of ***, who presents to [facility] for ***.     Past Medical History:  She has a past medical history of Personal history of other diseases of the circulatory system and Personal history of other diseases of the musculoskeletal system and connective tissue.    Past Surgical History:  She has a past surgical history that includes Other surgical history (11/20/2020); Other surgical history (11/20/2020); and Other surgical history (11/20/2020).     Social History:  She reports that she has quit smoking. Her smoking use included cigarettes. She has never used smokeless tobacco. No history on file for alcohol use and drug use.    Family History:  No family history on file.    Allergies:  Aspirin, Butorphanol, Coconut, Coconut oil, Codeine, Cranberry, Diltiazem, Diltiazem hcl, Grape, Hydralazine, Iodinated contrast media, Metoprolol, Paroxetine hcl, Sertraline, and Sulfa (sulfonamide antibiotics)    Home Medications:  (Not in a hospital admission)    Review Of Systems:  11-point ROS was performed and is negative except as noted below and in the HPI.     Review of Systems     Objective   Objective:     There were no vitals taken for this visit.    Physical Exam     Assessment & Plan:     Assessment/Plan     Problem List Items Addressed This Visit    None    #Health Maintenance:  - ***    Revisit Topics: ***    Dispo: Patient is scheduled to return to clinic in ***.    Chuck Giordano DO, PGY-2  Internal Medicine     Disclaimer: Documentation completed with the information available at the time of input. The times in the chart may not be reflective of actual patient care times, interventions, or procedures. Documentation occurs after the physical care of the patient.

## 2024-05-16 ENCOUNTER — TELEMEDICINE CLINICAL SUPPORT (OUTPATIENT)
Dept: NUTRITION | Facility: CLINIC | Age: 89
End: 2024-05-16
Payer: MEDICARE

## 2024-05-16 DIAGNOSIS — Z71.3 DIETARY COUNSELING AND SURVEILLANCE: Primary | ICD-10-CM

## 2024-05-16 DIAGNOSIS — R63.0 DECREASED APPETITE: ICD-10-CM

## 2024-05-16 DIAGNOSIS — E44.0 PROTEIN-CALORIE MALNUTRITION, MODERATE (MULTI): ICD-10-CM

## 2024-05-16 PROCEDURE — 97802 MEDICAL NUTRITION INDIV IN: CPT

## 2024-05-16 NOTE — PROGRESS NOTES
"NUTRITION ASSESSMENT NOTE    Reason for Nutrition Visit:  Pt is a 89 y.o. female being seen virtually for an initial appointment at [facility] referred for decreased appetite and protein calorie malnutrition, moderate.        Anthropometrics:  Wt: 137#, 62.1kg  Wt Hx: 169# ([id number]), 166# ([id number])  Ht: 5'5\"  BMI: 22.8 kg/m2  Weight Change: Yes  Significant Wt Change: Yes, 32# (18.9%) x 8 mo      Past Medical Hx:  Patient Active Problem List   Diagnosis    Anemia    Anxiety    Cerumen impaction    Breast pain in female    Chest pain, atypical    CHF (congestive heart failure) (Multi)    Chronic pain of right knee    Dementia with behavioral disturbance (Multi)    Constipation, chronic    Essential hypertension    Dysuria    GERD (gastroesophageal reflux disease)    Headache, vasomotor    Headache, worsening    Memory deficits    Multiple kidney stones    Neck muscle spasm    Neck pain, chronic    Osteoarthritis    Low back pain radiating to right leg    Pain of back and left lower extremity    Pain of left lower leg    Pain of right hand    Hip pain, right    Pain of right lower extremity    Right foot pain    Swelling of lower extremity    UTI (urinary tract infection)    Vitamin D deficiency    Urinary frequency    Stone, bladder, diverticulum    Palpitations    Osteopenia    Medication nonadherence due to intolerance    Insomnia    Hypokalemia    Hypertriglyceridemia    HLD (hyperlipidemia)    Generalized anxiety disorder    Asthma (HHS-HCC)    Acute URI    Chest pain    Dementia (Multi)    HTN (hypertension)    Renal lesion    Right renal stone          Lab Results   Component Value Date    HGBA1C 5.7 (A) 05/30/2023    HGBA1C 5.6 06/12/2020    CHOL 152 05/30/2023    LDLF 78 05/30/2023    TRIG 62 05/30/2023          Food and Nutrition Hx:  Pt, accompanied and assisted by daughter Nella, states they seek  from dietitian for help addressing pt decreased appetite and weight loss. According to recent referring " "provider note ([id number]), pt does not eat much day to day, exacerbated by pt being a picky eater. Pt states that while growing up, her mother always told her she never ate enough food - pt states she feels like she has recently been eating more than when younger. Pt demonstrates weakness, though she states that it's not overly problematic and she admits to still getting around and cleaning her room often. Pt currently living with daughter, who prepares and cooks all the food for the pt. Pt daughter states that pt is no longer consuming foods she had her entire life d/t lack of appeal -- nuts, nut butters, beans, eggs, beef and pork -- items and food she used to lean into regularly. Daughter has scale at home, gets pt wt regularly - RDN advised to track wt daily to ascertain if nutritional changes are helping prevent further wt loss and if changes allow for wt gain. RDN encouraged follow-up x 4 weeks to assess wt status.     Pt bowel movements slowed down, often going 3-4 days between passing stool. Water intake ~8-16oz maximum day to day, which also complicates renal health. Pt was consuming Boost shakes once daily, but no longer consumes because pt feels they are too \"milky.\" To complicate matters, pt suffers from dementia and severe anxiety. D/t last renal labs showing elevated creatinine and lowered eGFR, RDN makes conservative recommendations for protein intake to promote and protect kidney health, especially since pt is not consuming much if any water day to day. As for the protein shakes/supplements, RDN advising one per day until updated renal lab work can be drawn and collected. Pt and daughter provided with a slew of medical nutrition therapy recommendations for high calorie eating.       DIETARY RECALL: *Pt consumes an average of 2 meals/day*  Wake-up: Varied timing ~10am/11am  Meal 1: Most days, ~12pm/1pm, Grits w/ breakfast meat and apple sauce, banana muffin w/ apple sauce, pancakes w/ syrup and meat, " "grilled cheese toast  Meal 2: Most days, Varied timing ~5pm/6pm, Fish, chicken, baked potato w/ butter/cheese, fries, cake, pie, broccoli, mashed potatoes w/ milk/butter, rice, shrimp fried rice, string beans, macaroni and cheese, greens, stuffing/dressing  Snacks: Not generally consumed - fruit, bananas, grapes, candies, oranges  Bedtime: Varied timing ~11pm onward  Beverages: Water x ~8-16oz daily, Pepsi x 4oz daily, Ginger ale x 4oz daily      NUTRITIONAL ARTIFACTS:  No longer likes to drink milk -- does not care for fruit juice -- does not like eggs    Allergies: Coconut  Intolerance: Lactose, strawberries  Appetite: Poor  Intake: 50-75%  GI Symptoms : constipation Frequency: frequent  Swallowing Difficulty: No problems with swallowing  Dentition : lower denture/partial and upper denture/partial -- Fair condition, loose fitting, no issues chewing per pt    Supplements: Denies     Energy Levels: Stable -- \"Pretty good\" per pt    Food Preparation: Children -- Daughter prepares and cooks food  Cooking Skills/Barriers: Disability/Limited Mobility       Nutrition Focused Physical Exam:    Deferred due to nature of virtual appointment      Malnutrition Present: Unable to Determine at this Time    Estimated Energy Needs:    *Pt BMI @ 22.1 kg/m2*  WEIGHT MAINTENANCE: 25-30 kcal/kg ABW = 2860-9244 kcal/day  WEIGHT GAIN: 30-35 kcal/kg ABW = 7957-8801 kcal/day  PROTEIN Needs: 0.8-1 g/kg = 50-60 g/day --- Pt recent renal labs show elevated creatinine (1.06) and eGFR @ 50, indicating potential renal impairment. Conservative protein recommendations to protect renal function might indicate 0.6-0.8 g/kg = 35-50 g/day    Nutrition Diagnosis:    Diagnosis Statement 1:  Diagnosis Status: New  Diagnosis : Malnutrition; moderate starvation related related to  decreased appetite and appeal of food  as evidenced by  significant wt change of 18.9% x 8 mo, dietary intake of protein and energy at levels <50-75% recommended needs and " amounts, and recent medical dx of moderate protein-calorie malnutrition    Diagnosis Statement 2:  Diagnosis Status: New  Diagnosis : Unintended weight loss  related to  decreased appetite  as evidenced by poor intake, change in eating habits, early satiety, skipped meals and wt loss of 32# (18.9%) x 8 mo    Diagnosis Statement 3:   Diagnosis Status: New  Diagnosis : Inadequate energy intake  related to  decreased appetite  as evidenced by  dietary recall reflecting daily intake of calories <75% daily recommended needs x >3 mo    Nutrition Interventions:  Consistent Carbohydrate Diet, Increased Carbohydrate Diet, Increased Energy Diet, Increased Fat Diet, and Increased Protein Diet  Nutrition Counseling: Motivational Interviewing, Goal Setting, and Health Belief Model --- Increased protein in conservative manner for renal integrity  Coordination of Care: Referring Provider          Educational Handouts: High-Protein High-Calorie MNT/Food List/Recipes, Oral Supplement Recommendations (Jacquelyn VILLATORO)    Readiness to Change : Fair  Level of Understanding : Fair  Anticipated Compliant : Fair

## 2024-10-10 ENCOUNTER — APPOINTMENT (OUTPATIENT)
Dept: PRIMARY CARE | Facility: CLINIC | Age: 89
End: 2024-10-10
Payer: MEDICARE

## 2024-10-10 ENCOUNTER — LAB (OUTPATIENT)
Dept: LAB | Facility: LAB | Age: 89
End: 2024-10-10
Payer: MEDICARE

## 2024-10-10 VITALS
HEIGHT: 65 IN | DIASTOLIC BLOOD PRESSURE: 69 MMHG | BODY MASS INDEX: 21.33 KG/M2 | SYSTOLIC BLOOD PRESSURE: 114 MMHG | HEART RATE: 66 BPM | WEIGHT: 128 LBS

## 2024-10-10 DIAGNOSIS — D64.9 ANEMIA, UNSPECIFIED TYPE: ICD-10-CM

## 2024-10-10 DIAGNOSIS — I10 ESSENTIAL HYPERTENSION: ICD-10-CM

## 2024-10-10 DIAGNOSIS — Z23 FLU VACCINE NEED: ICD-10-CM

## 2024-10-10 DIAGNOSIS — K21.9 GASTROESOPHAGEAL REFLUX DISEASE WITHOUT ESOPHAGITIS: Primary | ICD-10-CM

## 2024-10-10 LAB
ALBUMIN SERPL BCP-MCNC: 4 G/DL (ref 3.4–5)
ALP SERPL-CCNC: 52 U/L (ref 33–136)
ALT SERPL W P-5'-P-CCNC: 7 U/L (ref 7–45)
ANION GAP SERPL CALC-SCNC: 13 MMOL/L (ref 10–20)
AST SERPL W P-5'-P-CCNC: 16 U/L (ref 9–39)
BASOPHILS # BLD AUTO: 0.02 X10*3/UL (ref 0–0.1)
BASOPHILS NFR BLD AUTO: 0.3 %
BILIRUB SERPL-MCNC: 0.7 MG/DL (ref 0–1.2)
BUN SERPL-MCNC: 36 MG/DL (ref 6–23)
CALCIUM SERPL-MCNC: 9.9 MG/DL (ref 8.6–10.6)
CHLORIDE SERPL-SCNC: 107 MMOL/L (ref 98–107)
CO2 SERPL-SCNC: 25 MMOL/L (ref 21–32)
CREAT SERPL-MCNC: 2.05 MG/DL (ref 0.5–1.05)
EGFRCR SERPLBLD CKD-EPI 2021: 22 ML/MIN/1.73M*2
EOSINOPHIL # BLD AUTO: 0.01 X10*3/UL (ref 0–0.4)
EOSINOPHIL NFR BLD AUTO: 0.1 %
ERYTHROCYTE [DISTWIDTH] IN BLOOD BY AUTOMATED COUNT: 14.8 % (ref 11.5–14.5)
FERRITIN SERPL-MCNC: 342 NG/ML (ref 8–150)
GLUCOSE SERPL-MCNC: 98 MG/DL (ref 74–99)
HCT VFR BLD AUTO: 30.8 % (ref 36–46)
HGB BLD-MCNC: 9.6 G/DL (ref 12–16)
IMM GRANULOCYTES # BLD AUTO: 0.03 X10*3/UL (ref 0–0.5)
IMM GRANULOCYTES NFR BLD AUTO: 0.4 % (ref 0–0.9)
IRON SATN MFR SERPL: 19 % (ref 25–45)
IRON SERPL-MCNC: 52 UG/DL (ref 35–150)
LYMPHOCYTES # BLD AUTO: 1.85 X10*3/UL (ref 0.8–3)
LYMPHOCYTES NFR BLD AUTO: 26.6 %
MCH RBC QN AUTO: 29 PG (ref 26–34)
MCHC RBC AUTO-ENTMCNC: 31.2 G/DL (ref 32–36)
MCV RBC AUTO: 93 FL (ref 80–100)
MONOCYTES # BLD AUTO: 0.46 X10*3/UL (ref 0.05–0.8)
MONOCYTES NFR BLD AUTO: 6.6 %
NEUTROPHILS # BLD AUTO: 4.59 X10*3/UL (ref 1.6–5.5)
NEUTROPHILS NFR BLD AUTO: 66 %
NRBC BLD-RTO: 0 /100 WBCS (ref 0–0)
PLATELET # BLD AUTO: 197 X10*3/UL (ref 150–450)
POTASSIUM SERPL-SCNC: 4.1 MMOL/L (ref 3.5–5.3)
PROT SERPL-MCNC: 7.3 G/DL (ref 6.4–8.2)
RBC # BLD AUTO: 3.31 X10*6/UL (ref 4–5.2)
SODIUM SERPL-SCNC: 141 MMOL/L (ref 136–145)
TIBC SERPL-MCNC: 273 UG/DL (ref 240–445)
TSH SERPL-ACNC: 2.89 MIU/L (ref 0.44–3.98)
UIBC SERPL-MCNC: 221 UG/DL (ref 110–370)
WBC # BLD AUTO: 7 X10*3/UL (ref 4.4–11.3)

## 2024-10-10 PROCEDURE — 80053 COMPREHEN METABOLIC PANEL: CPT

## 2024-10-10 PROCEDURE — 99214 OFFICE O/P EST MOD 30 MIN: CPT | Performed by: INTERNAL MEDICINE

## 2024-10-10 PROCEDURE — 82728 ASSAY OF FERRITIN: CPT

## 2024-10-10 PROCEDURE — 3074F SYST BP LT 130 MM HG: CPT | Performed by: INTERNAL MEDICINE

## 2024-10-10 PROCEDURE — 83540 ASSAY OF IRON: CPT

## 2024-10-10 PROCEDURE — 90662 IIV NO PRSV INCREASED AG IM: CPT | Performed by: INTERNAL MEDICINE

## 2024-10-10 PROCEDURE — 3078F DIAST BP <80 MM HG: CPT | Performed by: INTERNAL MEDICINE

## 2024-10-10 PROCEDURE — 1160F RVW MEDS BY RX/DR IN RCRD: CPT | Performed by: INTERNAL MEDICINE

## 2024-10-10 PROCEDURE — 1159F MED LIST DOCD IN RCRD: CPT | Performed by: INTERNAL MEDICINE

## 2024-10-10 PROCEDURE — 83550 IRON BINDING TEST: CPT

## 2024-10-10 PROCEDURE — G2211 COMPLEX E/M VISIT ADD ON: HCPCS | Performed by: INTERNAL MEDICINE

## 2024-10-10 PROCEDURE — 84443 ASSAY THYROID STIM HORMONE: CPT

## 2024-10-10 PROCEDURE — 85025 COMPLETE CBC W/AUTO DIFF WBC: CPT

## 2024-10-10 PROCEDURE — 36415 COLL VENOUS BLD VENIPUNCTURE: CPT

## 2024-10-10 PROCEDURE — G0008 ADMIN INFLUENZA VIRUS VAC: HCPCS | Performed by: INTERNAL MEDICINE

## 2024-10-10 RX ORDER — OLMESARTAN MEDOXOMIL-HYDROCHLOROTHIAZIDE 20; 12.5 MG/1; MG/1
1 TABLET, FILM COATED ORAL DAILY
Qty: 90 TABLET | Refills: 1 | Status: SHIPPED | OUTPATIENT
Start: 2024-10-10 | End: 2025-04-08

## 2024-10-10 RX ORDER — PANTOPRAZOLE SODIUM 40 MG/1
40 TABLET, DELAYED RELEASE ORAL DAILY
Qty: 30 TABLET | Refills: 1 | Status: SHIPPED | OUTPATIENT
Start: 2024-10-10 | End: 2024-12-09

## 2024-10-10 ASSESSMENT — LIFESTYLE VARIABLES
AUDIT-C TOTAL SCORE: 0
HOW MANY STANDARD DRINKS CONTAINING ALCOHOL DO YOU HAVE ON A TYPICAL DAY: PATIENT DOES NOT DRINK
SKIP TO QUESTIONS 9-10: 1
HOW OFTEN DO YOU HAVE SIX OR MORE DRINKS ON ONE OCCASION: NEVER
HOW OFTEN DO YOU HAVE A DRINK CONTAINING ALCOHOL: NEVER

## 2024-10-10 ASSESSMENT — PATIENT HEALTH QUESTIONNAIRE - PHQ9
2. FEELING DOWN, DEPRESSED OR HOPELESS: NOT AT ALL
1. LITTLE INTEREST OR PLEASURE IN DOING THINGS: NOT AT ALL
SUM OF ALL RESPONSES TO PHQ9 QUESTIONS 1 AND 2: 0

## 2024-10-10 NOTE — PROGRESS NOTES
Primary care office Note      Name: Umu Mckeon, Age: 89 y.o., Gender: female, MRN: [id number]   Pharmacy:   [id number] - [place], [place] - [address]  [address] [address]  Phone: [phone] Fax: [phone]     PCP: Gabriela Palmer        Subjective:   Chief Complaint   Patient presents with    Follow-up     Refill Benicar / flu shot        Umu Mckeon is a 89 y.o. female who presented to the clinic today for follow up     HPI:   Umu Mckeon is a 89 y.o. female who presents with her daughter Nella in the room     Weight loss / Decreased appetite  -was using shakes and then decided she didn't want them  -met with dietician  -stopped taking the mirtazapine  -she feels some things give her indigestion    Daughter feels patient is not eating well and has difficulty getting her to eat and drink meal replacement shakes.   Pt is taking her BP medication and tylenol regularly.      The patient denies headaches, lightheadedness, changes in speech/vision, photophobia, dysphagia, diaphoresis, Chest pain, dyspnea, Abdominal pain, recent falls or trauma, and changes in bowel/urinary habits.     ROS:   12 points review of system is negative excepted as stated above in the HPI.    Medical History      PMH:    has a past medical history of Personal history of other diseases of the circulatory system and Personal history of other diseases of the musculoskeletal system and connective tissue.   Allergies:   Allergies   Allergen Reactions    Aspirin Unknown    Butorphanol Unknown    Coconut Unknown    Coconut Oil Hives    Codeine Unknown    Cranberry Hives    Diltiazem Unknown    Diltiazem Hcl Unknown    Grape Hives    Hydralazine Unknown    Iodinated Contrast Media Unknown    Metoprolol Unknown    Paroxetine Hcl Unknown    Sertraline Unknown    Sulfa (Sulfonamide Antibiotics) Unknown      Surgical Hx:   Past Surgical History:   Procedure Laterality Date    OTHER  SURGICAL HISTORY  2020     section    OTHER SURGICAL HISTORY  2020    Hysterectomy    OTHER SURGICAL HISTORY  2020    Appendectomy      Social HX:   Social History     Tobacco Use    Smoking status: Former     Current packs/day: 0.00     Types: Cigarettes     Quit date:      Years since quittin.7     Passive exposure: Past    Smokeless tobacco: Never   Substance Use Topics    Alcohol use: Not Currently    Drug use: Not Currently        MEDS:   Current Outpatient Medications   Medication Instructions    acetaminophen (Tylenol Arthritis Pain) 650 mg ER tablet     Benicar HCT 20-12.5 mg tablet 1 tablet, oral, Daily    food supplemt, lactose-reduced 0.06 gram- 1.5 kcal/mL liquid 1 bottle, oral, 3 times daily    mirtazapine (REMERON) 7.5 mg, oral, Nightly    pantoprazole (PROTONIX) 40 mg, oral, Daily, Do not crush, chew, or split.        Objective Data     Objective:   Visit Vitals  /69 (BP Location: Left arm, Patient Position: Sitting, BP Cuff Size: Adult)   Pulse 66        Physical Examination:   GE; sitting comfortably in a chair, in NAD  HEENT; AT/NC, no conjunctival pallor, anicteric sclera  Neck; Supple neck, no LAD/JVD  Chest; CTAbl, no adventitious sounds  CVS; s1 and s2 only no MGR, RRR  Ext; no cyanosis/clubbing/edema, pulses intact  Neuro; Aox3  Psych; normal mood     Last Labs:   CBC:   WBC   Date Value Ref Range Status   2023 4.9 4.4 - 11.3 x10E9/L Final   2023 5.7 4.4 - 11.3 x10E9/L Final   2022 6.6 4.4 - 11.3 x10E9/L Final     Hemoglobin   Date Value Ref Range Status   2023 10.6 (L) 12.0 - 16.0 g/dL Final   2023 10.8 (L) 12.0 - 16.0 g/dL Final   2022 11.4 (L) 12.0 - 16.0 g/dL Final     MCV   Date Value Ref Range Status   2023 93 80 - 100 fL Final   2023 91 80 - 100 fL Final   2022 88 80 - 100 fL Final     Platelets   Date Value Ref Range Status   2023 189 150 - 450 x10E9/L Final   2023 175 150 - 450  x10E9/L Final   09/13/2022 178 150 - 450 x10E9/L Final      CMP:   Sodium   Date Value Ref Range Status   07/13/2023 141 136 - 145 mmol/L Final   05/30/2023 145 136 - 145 mmol/L Final   09/13/2022 140 136 - 145 mmol/L Final     Potassium   Date Value Ref Range Status   07/13/2023 3.4 (L) 3.5 - 5.3 mmol/L Final   05/30/2023 3.5 3.5 - 5.3 mmol/L Final   09/13/2022 3.7 3.5 - 5.3 mmol/L Final     Chloride   Date Value Ref Range Status   07/13/2023 106 98 - 107 mmol/L Final   05/30/2023 110 (H) 98 - 107 mmol/L Final   09/13/2022 105 98 - 107 mmol/L Final     Urea Nitrogen   Date Value Ref Range Status   07/13/2023 10 6 - 23 mg/dL Final   05/30/2023 12 6 - 23 mg/dL Final   09/13/2022 11 6 - 23 mg/dL Final     Creatinine   Date Value Ref Range Status   07/13/2023 1.06 (H) 0.50 - 1.05 mg/dL Final   05/30/2023 0.96 0.50 - 1.05 mg/dL Final   09/13/2022 1.02 0.50 - 1.05 mg/dL Final      A1c:   Hemoglobin A1C   Date Value Ref Range Status   05/30/2023 5.7 (A) % Final     Comment:          Diagnosis of Diabetes-Adults   Non-Diabetic: < or = 5.6%   Increased risk for developing diabetes: 5.7-6.4%   Diagnostic of diabetes: > or = 6.5%  .       Monitoring of Diabetes                Age (y)     Therapeutic Goal (%)   Adults:          >18           <7.0   Pediatrics:    13-18           <7.5                   7-12           <8.0                   0- 6            7.5-8.5   American Diabetes Association. Diabetes Care 33(S1), Jan 2010.   09/14/2021 5.7 (A) % Final     Comment:          Diagnosis of Diabetes-Adults   Non-Diabetic: < or = 5.6%   Increased risk for developing diabetes: 5.7-6.4%   Diagnostic of diabetes: > or = 6.5%  .       Monitoring of Diabetes                Age (y)     Therapeutic Goal (%)   Adults:          >18           <7.0   Pediatrics:    13-18           <7.5                   7-12           <8.0                   0- 6            7.5-8.5   American Diabetes Association. Diabetes Care 33(S1), Jan 2010.      06/12/2020 5.6 4.3 - 5.6 % Final     Comment:     American Diabetes Association guidelines indicate that patients with HgbA1c in   the range 5.7-6.4% are at increased risk for development of diabetes, and   intervention by lifestyle modification may be beneficial. HgbA1c greater or   equal to 6.5% is considered diagnostic of diabetes.        Other labs;   Vit D:   Vitamin D, 25-Hydroxy   Date Value Ref Range Status   05/30/2023 31 ng/mL Final     Comment:     .  DEFICIENCY:         < 20   NG/ML  INSUFFICIENCY:      20-29  NG/ML  SUFFICIENCY:         NG/ML    THIS ASSAY ACCURATELY QUANTIFIES THE SUM OF  VITAMIN D3, 25-HYDROXY AND VIT D2,25-HYDROXY.   11/11/2021 27 (A) ng/mL Final     Comment:     .  DEFICIENCY:         < 20   NG/ML  INSUFFICIENCY:      20-29  NG/ML  SUFFICIENCY:         NG/ML    THIS ASSAY ACCURATELY QUANTIFIES THE SUM OF  VITAMIN D3, 25-HYDROXY AND VIT D2,25-HYDROXY.     11/16/2020 26 (A) ng/mL Final     Comment:     .  DEFICIENCY:         < 20   NG/ML  INSUFFICIENCY:      20-29  NG/ML  SUFFICIENCY:         NG/ML    THIS ASSAY ACCURATELY QUANTIFIES THE SUM OF  VITAMIN D3, 25-HYDROXY AND VIT D2,25-HYDROXY.        TSH:  TSH   Date Value Ref Range Status   07/13/2023 2.66 0.44 - 3.98 mIU/L Final     Comment:      TSH testing is performed using different testing    methodology at [facility] than at other    [facility]. Direct result comparisons should    only be made within the same method.     05/30/2023 1.76 0.44 - 3.98 mIU/L Final     Comment:      TSH testing is performed using different testing    methodology at [facility] than at other    [facility]. Direct result comparisons should    only be made within the same method.   11/11/2021 2.15 0.44 - 3.98 mIU/L Final     Comment:      TSH testing is performed using different testing    methodology at [facility] than at other    [facility]. Direct result comparisons should     only be made within the same method.          Assessment and Plan     Problem List Items Addressed This Visit       Anemia    Relevant Orders    CBC and Auto Differential    Ferritin    Iron and TIBC    Essential hypertension    Relevant Medications    Benicar HCT 20-12.5 mg tablet    Other Relevant Orders    CBC and Auto Differential    Comprehensive Metabolic Panel    Ferritin    Iron and TIBC    TSH with reflex to Free T4 if abnormal    GERD (gastroesophageal reflux disease) - Primary    Relevant Medications    pantoprazole (ProtoNix) 40 mg EC tablet     Other Visit Diagnoses       Flu vaccine need        Relevant Orders    Flu vaccine, trivalent, preservative free, HIGH-DOSE, age 65y+ (Fluzone) (Completed)            Gabriela Palmer, DO

## 2024-10-21 DIAGNOSIS — N18.4 STAGE 4 CHRONIC KIDNEY DISEASE (MULTI): Primary | ICD-10-CM

## 2024-10-21 DIAGNOSIS — E61.1 IRON DEFICIENCY: ICD-10-CM

## 2024-10-21 RX ORDER — FERROUS SULFATE 325(65) MG
325 TABLET, DELAYED RELEASE (ENTERIC COATED) ORAL
Qty: 90 TABLET | Refills: 1 | Status: SHIPPED | OUTPATIENT
Start: 2024-10-21 | End: 2025-04-19

## 2024-12-17 DIAGNOSIS — K21.9 GASTROESOPHAGEAL REFLUX DISEASE WITHOUT ESOPHAGITIS: ICD-10-CM

## 2024-12-18 RX ORDER — PANTOPRAZOLE SODIUM 40 MG/1
TABLET, DELAYED RELEASE ORAL
Qty: 90 TABLET | Refills: 1 | Status: SHIPPED | OUTPATIENT
Start: 2024-12-18

## 2025-01-20 ENCOUNTER — APPOINTMENT (OUTPATIENT)
Dept: PRIMARY CARE | Facility: CLINIC | Age: OVER 89
End: 2025-01-20
Payer: MEDICARE

## 2025-02-14 DIAGNOSIS — I10 ESSENTIAL HYPERTENSION: ICD-10-CM

## 2025-02-26 ENCOUNTER — APPOINTMENT (OUTPATIENT)
Dept: PRIMARY CARE | Facility: CLINIC | Age: OVER 89
End: 2025-02-26
Payer: MEDICARE

## 2025-02-26 VITALS
SYSTOLIC BLOOD PRESSURE: 106 MMHG | BODY MASS INDEX: 22.16 KG/M2 | HEIGHT: 65 IN | DIASTOLIC BLOOD PRESSURE: 58 MMHG | WEIGHT: 133 LBS

## 2025-02-26 DIAGNOSIS — I50.9 CHRONIC CONGESTIVE HEART FAILURE, UNSPECIFIED HEART FAILURE TYPE: ICD-10-CM

## 2025-02-26 DIAGNOSIS — R41.3 MEMORY DEFICITS: ICD-10-CM

## 2025-02-26 DIAGNOSIS — N18.4 STAGE 4 CHRONIC KIDNEY DISEASE (MULTI): ICD-10-CM

## 2025-02-26 DIAGNOSIS — E61.1 IRON DEFICIENCY: ICD-10-CM

## 2025-02-26 DIAGNOSIS — F03.B11 MODERATE DEMENTIA WITH AGITATION, UNSPECIFIED DEMENTIA TYPE (MULTI): Primary | ICD-10-CM

## 2025-02-26 PROCEDURE — 3074F SYST BP LT 130 MM HG: CPT | Performed by: INTERNAL MEDICINE

## 2025-02-26 PROCEDURE — G0439 PPPS, SUBSEQ VISIT: HCPCS | Performed by: INTERNAL MEDICINE

## 2025-02-26 PROCEDURE — 1170F FXNL STATUS ASSESSED: CPT | Performed by: INTERNAL MEDICINE

## 2025-02-26 PROCEDURE — 1160F RVW MEDS BY RX/DR IN RCRD: CPT | Performed by: INTERNAL MEDICINE

## 2025-02-26 PROCEDURE — 3078F DIAST BP <80 MM HG: CPT | Performed by: INTERNAL MEDICINE

## 2025-02-26 PROCEDURE — 1159F MED LIST DOCD IN RCRD: CPT | Performed by: INTERNAL MEDICINE

## 2025-02-26 PROCEDURE — 99214 OFFICE O/P EST MOD 30 MIN: CPT | Performed by: INTERNAL MEDICINE

## 2025-02-26 ASSESSMENT — ACTIVITIES OF DAILY LIVING (ADL)
GROCERY_SHOPPING: NEEDS ASSISTANCE
TAKING_MEDICATION: NEEDS ASSISTANCE
BATHING: INDEPENDENT
DOING_HOUSEWORK: INDEPENDENT
MANAGING_FINANCES: NEEDS ASSISTANCE
DRESSING: INDEPENDENT

## 2025-02-26 ASSESSMENT — PATIENT HEALTH QUESTIONNAIRE - PHQ9
SUM OF ALL RESPONSES TO PHQ9 QUESTIONS 1 AND 2: 0
2. FEELING DOWN, DEPRESSED OR HOPELESS: NOT AT ALL
1. LITTLE INTEREST OR PLEASURE IN DOING THINGS: NOT AT ALL

## 2025-02-26 NOTE — PROGRESS NOTES
Primary care office Note      Name: Umu Mckeon, Age: 89 y.o., Gender: female, MRN: [id number]   Pharmacy:   [id number] - [place], [place] - [address]  [address] [address]  Phone: [phone] Fax: [phone]     PCP: Gabriela Palmer        Subjective:   Chief Complaint   Patient presents with    MCW        Umu Mckeon is a 89 y.o. female who presented to the clinic today for AWV and Follow Up     HPI:   Umu Mckeon is a 89 y.o. female  Pt is feeling well. She is still taking her meds as prescribed. Her memory is still not doing well and her family has a hard time to behaviors. They would like to try geriatrics again.  She will be in need of a new PCP soon and maybe geriatrician could take her on as primary.    The patient denies headaches, lightheadedness, changes in speech/vision, photophobia, dysphagia, diaphoresis, Chest pain, dyspnea, Abdominal pain, recent falls or trauma, and changes in bowel/urinary habits.     ROS:   12 points review of system is negative excepted as stated above in the HPI.    Medical History      PMH:    has a past medical history of Personal history of other diseases of the circulatory system and Personal history of other diseases of the musculoskeletal system and connective tissue.   Allergies:   Allergies   Allergen Reactions    Aspirin Unknown    Butorphanol Unknown    Coconut Unknown    Coconut Oil Hives    Codeine Unknown    Cranberry Hives    Diltiazem Unknown    Diltiazem Hcl Unknown    Grape Hives    Hydralazine Unknown    Iodinated Contrast Media Unknown    Metoprolol Unknown    Paroxetine Hcl Unknown    Sertraline Unknown    Sulfa (Sulfonamide Antibiotics) Unknown      Surgical Hx:   Past Surgical History:   Procedure Laterality Date    OTHER SURGICAL HISTORY  2020     section    OTHER SURGICAL HISTORY  2020    Hysterectomy    OTHER SURGICAL HISTORY  2020    Appendectomy      Social HX:    Social History     Tobacco Use    Smoking status: Former     Current packs/day: 0.00     Types: Cigarettes     Quit date:      Years since quittin.1     Passive exposure: Past    Smokeless tobacco: Never   Substance Use Topics    Alcohol use: Not Currently    Drug use: Not Currently        MEDS:   Current Outpatient Medications   Medication Instructions    acetaminophen (Tylenol Arthritis Pain) 650 mg ER tablet     Benicar HCT 20-12.5 mg tablet 1 tablet, oral, Daily    ferrous sulfate 325 (65 Fe) MG EC tablet 1 tablet, oral, Daily with breakfast, Do not crush, chew, or split.    food supplemt, lactose-reduced 0.06 gram- 1.5 kcal/mL liquid 1 bottle, oral, 3 times daily    pantoprazole (ProtoNix) 40 mg EC tablet TAKE ONE TABLET (40mg) BY MOUTH once DAILY. do not crush, chew, or split        Objective Data     Objective:   Visit Vitals  /58        Physical Examination:   GE; sitting comfortably in a chair, in NAD  HEENT; AT/NC, no conjunctival pallor, anicteric sclera  Neck; Supple neck, no LAD/JVD  Chest; CTAbl, no adventitious sounds  CVS; s1 and s2 only no MGR, RRR  Ext; no cyanosis/clubbing/edema, pulses intact  Neuro; Aox3  Psych; normal mood     Last Labs:   CBC:   WBC   Date Value Ref Range Status   10/10/2024 7.0 4.4 - 11.3 x10*3/uL Final   2023 4.9 4.4 - 11.3 x10E9/L Final   2023 5.7 4.4 - 11.3 x10E9/L Final     Hemoglobin   Date Value Ref Range Status   10/10/2024 9.6 (L) 12.0 - 16.0 g/dL Final   2023 10.6 (L) 12.0 - 16.0 g/dL Final   2023 10.8 (L) 12.0 - 16.0 g/dL Final     MCV   Date Value Ref Range Status   10/10/2024 93 80 - 100 fL Final   2023 93 80 - 100 fL Final   2023 91 80 - 100 fL Final     Platelets   Date Value Ref Range Status   10/10/2024 197 150 - 450 x10*3/uL Final   2023 189 150 - 450 x10E9/L Final   2023 175 150 - 450 x10E9/L Final      CMP:   Sodium   Date Value Ref Range Status   10/10/2024 141 136 - 145 mmol/L Final    07/13/2023 141 136 - 145 mmol/L Final   05/30/2023 145 136 - 145 mmol/L Final     Potassium   Date Value Ref Range Status   10/10/2024 4.1 3.5 - 5.3 mmol/L Final   07/13/2023 3.4 (L) 3.5 - 5.3 mmol/L Final   05/30/2023 3.5 3.5 - 5.3 mmol/L Final     Chloride   Date Value Ref Range Status   10/10/2024 107 98 - 107 mmol/L Final   07/13/2023 106 98 - 107 mmol/L Final   05/30/2023 110 (H) 98 - 107 mmol/L Final     Urea Nitrogen   Date Value Ref Range Status   10/10/2024 36 (H) 6 - 23 mg/dL Final   07/13/2023 10 6 - 23 mg/dL Final   05/30/2023 12 6 - 23 mg/dL Final     Creatinine   Date Value Ref Range Status   10/10/2024 2.05 (H) 0.50 - 1.05 mg/dL Final   07/13/2023 1.06 (H) 0.50 - 1.05 mg/dL Final   05/30/2023 0.96 0.50 - 1.05 mg/dL Final     eGFR   Date Value Ref Range Status   10/10/2024 22 (L) >60 mL/min/1.73m*2 Final     Comment:     Calculations of estimated GFR are performed using the 2021 CKD-EPI Study Refit equation without the race variable for the IDMS-Traceable creatinine methods.  https://jasn.asnjournals.org/content/early/2021/09/22/ASN.2421615666      A1c:   Hemoglobin A1C   Date Value Ref Range Status   05/30/2023 5.7 (A) % Final     Comment:          Diagnosis of Diabetes-Adults   Non-Diabetic: < or = 5.6%   Increased risk for developing diabetes: 5.7-6.4%   Diagnostic of diabetes: > or = 6.5%  .       Monitoring of Diabetes                Age (y)     Therapeutic Goal (%)   Adults:          >18           <7.0   Pediatrics:    13-18           <7.5                   7-12           <8.0                   0- 6            7.5-8.5   American Diabetes Association. Diabetes Care 33(S1), Jan 2010.   09/14/2021 5.7 (A) % Final     Comment:          Diagnosis of Diabetes-Adults   Non-Diabetic: < or = 5.6%   Increased risk for developing diabetes: 5.7-6.4%   Diagnostic of diabetes: > or = 6.5%  .       Monitoring of Diabetes                Age (y)     Therapeutic Goal (%)   Adults:          >18           <7.0    Pediatrics:    13-18           <7.5                   7-12           <8.0                   0- 6            7.5-8.5   American Diabetes Association. Diabetes Care 33(S1), Jan 2010.     06/12/2020 5.6 4.3 - 5.6 % Final     Comment:     American Diabetes Association guidelines indicate that patients with HgbA1c in   the range 5.7-6.4% are at increased risk for development of diabetes, and   intervention by lifestyle modification may be beneficial. HgbA1c greater or   equal to 6.5% is considered diagnostic of diabetes.        Other labs;   Vit D:   Vitamin D, 25-Hydroxy   Date Value Ref Range Status   05/30/2023 31 ng/mL Final     Comment:     .  DEFICIENCY:         < 20   NG/ML  INSUFFICIENCY:      20-29  NG/ML  SUFFICIENCY:         NG/ML    THIS ASSAY ACCURATELY QUANTIFIES THE SUM OF  VITAMIN D3, 25-HYDROXY AND VIT D2,25-HYDROXY.   11/11/2021 27 (A) ng/mL Final     Comment:     .  DEFICIENCY:         < 20   NG/ML  INSUFFICIENCY:      20-29  NG/ML  SUFFICIENCY:         NG/ML    THIS ASSAY ACCURATELY QUANTIFIES THE SUM OF  VITAMIN D3, 25-HYDROXY AND VIT D2,25-HYDROXY.     11/16/2020 26 (A) ng/mL Final     Comment:     .  DEFICIENCY:         < 20   NG/ML  INSUFFICIENCY:      20-29  NG/ML  SUFFICIENCY:         NG/ML    THIS ASSAY ACCURATELY QUANTIFIES THE SUM OF  VITAMIN D3, 25-HYDROXY AND VIT D2,25-HYDROXY.        TSH:  TSH   Date Value Ref Range Status   07/13/2023 2.66 0.44 - 3.98 mIU/L Final     Comment:      TSH testing is performed using different testing    methodology at [facility] than at other    system hospitals. Direct result comparisons should    only be made within the same method.     05/30/2023 1.76 0.44 - 3.98 mIU/L Final     Comment:      TSH testing is performed using different testing    methodology at [facility] than at other    [facility]. Direct result comparisons should    only be made within the same method.     Thyroid Stimulating Hormone    Date Value Ref Range Status   10/10/2024 2.89 0.44 - 3.98 mIU/L Final        Assessment and Plan     Problem List Items Addressed This Visit       Memory deficits    Relevant Orders    Referral to Geriatrics    Dementia - Primary    Relevant Orders    Referral to Geriatrics     Other Visit Diagnoses       Iron deficiency        Relevant Orders    CBC and Auto Differential    Ferritin    Iron and TIBC    Stage 4 chronic kidney disease (Multi)        Relevant Orders    Comprehensive Metabolic Panel    Albumin-Creatinine Ratio, Urine Random            Gabriela Palmer DO   [facility] Billing Compliance Satisfied    *This is a visual tool to show completion of required items on the day of the visit. Green checks will only appear on the date of visit.    Review all medications by prescribing practitioner or clinical pharmacist (such as prescriptions, OTCs, herbal therapies and supplements) documented in the medical record    Past Medical, Surgical, and Family History reviewed and updated in chart    Tobacco Use Reviewed    Alcohol Use Reviewed    Illicit Drug Use Reviewed    PHQ2/9    Falls in Last Year Reviewed    Home Safety Risk Factors Reviewed    Cognitive Impairment Reviewed    Patient Self Assessment and Health Status    Current Diet Reviewed    Exercise Frequency    ADL - Hearing Impairment    ADL - Bathing    ADL - Dressing    ADL - Walks in Home    IADL - Managing Finances    IADL - Grocery Shopping    IADL - Taking Medications    IADL - Doing Housework

## 2025-02-27 ENCOUNTER — APPOINTMENT (OUTPATIENT)
Dept: NEPHROLOGY | Facility: CLINIC | Age: OVER 89
End: 2025-02-27
Payer: MEDICARE

## 2025-02-27 LAB
ALBUMIN SERPL-MCNC: 3.8 G/DL (ref 3.6–5.1)
ALP SERPL-CCNC: 56 U/L (ref 37–153)
ALT SERPL-CCNC: 8 U/L (ref 6–29)
ANION GAP SERPL CALCULATED.4IONS-SCNC: 7 MMOL/L (CALC) (ref 7–17)
AST SERPL-CCNC: 14 U/L (ref 10–35)
BASOPHILS # BLD AUTO: 22 CELLS/UL (ref 0–200)
BASOPHILS NFR BLD AUTO: 0.4 %
BILIRUB SERPL-MCNC: 0.5 MG/DL (ref 0.2–1.2)
BUN SERPL-MCNC: 35 MG/DL (ref 7–25)
CALCIUM SERPL-MCNC: 9 MG/DL (ref 8.6–10.4)
CHLORIDE SERPL-SCNC: 109 MMOL/L (ref 98–110)
CO2 SERPL-SCNC: 25 MMOL/L (ref 20–32)
CREAT SERPL-MCNC: 1.79 MG/DL (ref 0.6–0.95)
EGFRCR SERPLBLD CKD-EPI 2021: 26 ML/MIN/1.73M2
EOSINOPHIL # BLD AUTO: 22 CELLS/UL (ref 15–500)
EOSINOPHIL NFR BLD AUTO: 0.4 %
ERYTHROCYTE [DISTWIDTH] IN BLOOD BY AUTOMATED COUNT: 12.6 % (ref 11–15)
FERRITIN SERPL-MCNC: 313 NG/ML (ref 16–288)
GLUCOSE SERPL-MCNC: 102 MG/DL (ref 65–139)
HCT VFR BLD AUTO: 28.5 % (ref 35–45)
HGB BLD-MCNC: 8.7 G/DL (ref 11.7–15.5)
IRON SATN MFR SERPL: 39 % (CALC) (ref 16–45)
IRON SERPL-MCNC: 80 MCG/DL (ref 45–160)
LYMPHOCYTES # BLD AUTO: 1393 CELLS/UL (ref 850–3900)
LYMPHOCYTES NFR BLD AUTO: 25.8 %
MCH RBC QN AUTO: 28.3 PG (ref 27–33)
MCHC RBC AUTO-ENTMCNC: 30.5 G/DL (ref 32–36)
MCV RBC AUTO: 92.8 FL (ref 80–100)
MONOCYTES # BLD AUTO: 481 CELLS/UL (ref 200–950)
MONOCYTES NFR BLD AUTO: 8.9 %
NEUTROPHILS # BLD AUTO: 3483 CELLS/UL (ref 1500–7800)
NEUTROPHILS NFR BLD AUTO: 64.5 %
PLATELET # BLD AUTO: 176 THOUSAND/UL (ref 140–400)
PMV BLD REES-ECKER: 11.3 FL (ref 7.5–12.5)
POTASSIUM SERPL-SCNC: 4 MMOL/L (ref 3.5–5.3)
PROT SERPL-MCNC: 6.4 G/DL (ref 6.1–8.1)
RBC # BLD AUTO: 3.07 MILLION/UL (ref 3.8–5.1)
SODIUM SERPL-SCNC: 141 MMOL/L (ref 135–146)
TIBC SERPL-MCNC: 207 MCG/DL (CALC) (ref 250–450)
WBC # BLD AUTO: 5.4 THOUSAND/UL (ref 3.8–10.8)

## 2025-03-05 ENCOUNTER — TELEPHONE (OUTPATIENT)
Dept: PRIMARY CARE | Facility: CLINIC | Age: OVER 89
End: 2025-03-05
Payer: MEDICARE

## 2025-03-07 ENCOUNTER — APPOINTMENT (OUTPATIENT)
Dept: NEPHROLOGY | Facility: CLINIC | Age: OVER 89
End: 2025-03-07
Payer: MEDICARE

## 2025-03-07 VITALS
DIASTOLIC BLOOD PRESSURE: 67 MMHG | OXYGEN SATURATION: 97 % | SYSTOLIC BLOOD PRESSURE: 106 MMHG | TEMPERATURE: 98.1 F | BODY MASS INDEX: 22.47 KG/M2 | HEART RATE: 73 BPM | WEIGHT: 135 LBS

## 2025-03-07 DIAGNOSIS — I10 ESSENTIAL HYPERTENSION: ICD-10-CM

## 2025-03-07 DIAGNOSIS — D64.9 ANEMIA, UNSPECIFIED TYPE: ICD-10-CM

## 2025-03-07 DIAGNOSIS — N18.4 CHRONIC KIDNEY DISEASE, STAGE 4 (SEVERE) (MULTI): Primary | ICD-10-CM

## 2025-03-07 PROCEDURE — 3074F SYST BP LT 130 MM HG: CPT | Performed by: NURSE PRACTITIONER

## 2025-03-07 PROCEDURE — 3078F DIAST BP <80 MM HG: CPT | Performed by: NURSE PRACTITIONER

## 2025-03-07 PROCEDURE — 1036F TOBACCO NON-USER: CPT | Performed by: NURSE PRACTITIONER

## 2025-03-07 PROCEDURE — 99204 OFFICE O/P NEW MOD 45 MIN: CPT | Performed by: NURSE PRACTITIONER

## 2025-03-07 PROCEDURE — 1159F MED LIST DOCD IN RCRD: CPT | Performed by: NURSE PRACTITIONER

## 2025-03-07 PROCEDURE — 1126F AMNT PAIN NOTED NONE PRSNT: CPT | Performed by: NURSE PRACTITIONER

## 2025-03-07 ASSESSMENT — ENCOUNTER SYMPTOMS
HEMATOLOGIC/LYMPHATIC NEGATIVE: 1
CARDIOVASCULAR NEGATIVE: 1
EYES NEGATIVE: 1
PSYCHIATRIC NEGATIVE: 1
MUSCULOSKELETAL NEGATIVE: 1
NEUROLOGICAL NEGATIVE: 1
CONSTITUTIONAL NEGATIVE: 1
RESPIRATORY NEGATIVE: 1
ENDOCRINE NEGATIVE: 1
GASTROINTESTINAL NEGATIVE: 1

## 2025-03-07 ASSESSMENT — PAIN SCALES - GENERAL: PAINLEVEL_OUTOF10: 0-NO PAIN

## 2025-03-07 NOTE — PROGRESS NOTES
History Of Present Illness  Umu Mckeon is a 89 y.o. female with medical history significant for CKD, HTN, anemia, and dementia who presents with the accompany of her daughter (POA per daughter's report) for an initial evaluation for CKD. The daughter provided majority of the history.     Past Medical History  As above.    Surgical History  She has a past surgical history that includes Other surgical history (11/20/2020); Other surgical history (11/20/2020); and Other surgical history (11/20/2020).     Social History  She reports that she quit smoking about 31 years ago. Her smoking use included cigarettes. She has been exposed to tobacco smoke. She has never used smokeless tobacco. She reports that she does not currently use alcohol. She reports that she does not currently use drugs.    Family History  No family history on file.     Allergies  Aspirin, Butorphanol, Coconut, Coconut oil, Codeine, Cranberry, Diltiazem, Diltiazem hcl, Grape, Hydralazine, Iodinated contrast media, Metoprolol, Paroxetine hcl, Sertraline, and Sulfa (sulfonamide antibiotics)    Review of Systems   Constitutional: Negative.    HENT:  Negative for hearing loss.    Eyes: Negative.    Respiratory: Negative.     Cardiovascular: Negative.    Gastrointestinal: Negative.    Endocrine: Negative.    Genitourinary: Negative.    Musculoskeletal: Negative.    Skin: Negative.    Neurological: Negative.    Hematological: Negative.    Psychiatric/Behavioral: Negative.          Physical Exam  Vitals reviewed.   Constitutional:       Appearance: Normal appearance.   HENT:      Mouth/Throat:      Mouth: Mucous membranes are dry.   Cardiovascular:      Rate and Rhythm: Normal rate and regular rhythm.      Pulses: Normal pulses.      Heart sounds: Normal heart sounds.   Pulmonary:      Effort: Pulmonary effort is normal.      Breath sounds: Normal breath sounds.   Musculoskeletal:         General: Normal range of motion.   Skin:     General: Skin is warm  and dry.   Neurological:      Mental Status: She is alert. Mental status is at baseline.   Psychiatric:         Mood and Affect: Mood normal.         Behavior: Behavior normal.          Last Recorded Vitals  Blood pressure 106/67, pulse 73, temperature 36.7 °C (98.1 °F), temperature source Temporal, weight 61.2 kg (135 lb), SpO2 97%.    Relevant Results  Recent Results (from the past 2 weeks)   CBC and Auto Differential    Collection Time: 02/26/25  2:44 PM   Result Value Ref Range    WHITE BLOOD CELL COUNT 5.4 3.8 - 10.8 Thousand/uL    RED BLOOD CELL COUNT 3.07 (L) 3.80 - 5.10 Million/uL    HEMOGLOBIN 8.7 (L) 11.7 - 15.5 g/dL    HEMATOCRIT 28.5 (L) 35.0 - 45.0 %    MCV 92.8 80.0 - 100.0 fL    MCH 28.3 27.0 - 33.0 pg    MCHC 30.5 (L) 32.0 - 36.0 g/dL    RDW 12.6 11.0 - 15.0 %    PLATELET COUNT 176 140 - 400 Thousand/uL    MPV 11.3 7.5 - 12.5 fL    ABSOLUTE NEUTROPHILS 3,483 1,500 - 7,800 cells/uL    ABSOLUTE LYMPHOCYTES 1,393 850 - 3,900 cells/uL    ABSOLUTE MONOCYTES 481 200 - 950 cells/uL    ABSOLUTE EOSINOPHILS 22 15 - 500 cells/uL    ABSOLUTE BASOPHILS 22 0 - 200 cells/uL    NEUTROPHILS 64.5 %    LYMPHOCYTES 25.8 %    MONOCYTES 8.9 %    EOSINOPHILS 0.4 %    BASOPHILS 0.4 %   Ferritin    Collection Time: 02/26/25  2:44 PM   Result Value Ref Range    FERRITIN 313 (H) 16 - 288 ng/mL   Iron and TIBC    Collection Time: 02/26/25  2:44 PM   Result Value Ref Range    IRON, TOTAL 80 45 - 160 mcg/dL    IRON BINDING CAPACITY 207 (L) 250 - 450 mcg/dL (calc)    % SATURATION 39 16 - 45 % (calc)   Comprehensive Metabolic Panel    Collection Time: 02/26/25  2:44 PM   Result Value Ref Range    GLUCOSE 102 65 - 139 mg/dL    UREA NITROGEN (BUN) 35 (H) 7 - 25 mg/dL    CREATININE 1.79 (H) 0.60 - 0.95 mg/dL    EGFR 26 (L) > OR = 60 mL/min/1.73m2    SODIUM 141 135 - 146 mmol/L    POTASSIUM 4.0 3.5 - 5.3 mmol/L    CHLORIDE 109 98 - 110 mmol/L    CARBON DIOXIDE 25 20 - 32 mmol/L    ELECTROLYTE BALANCE 7 7 - 17 mmol/L (calc)    CALCIUM  "9.0 8.6 - 10.4 mg/dL    PROTEIN, TOTAL 6.4 6.1 - 8.1 g/dL    ALBUMIN 3.8 3.6 - 5.1 g/dL    BILIRUBIN, TOTAL 0.5 0.2 - 1.2 mg/dL    ALKALINE PHOSPHATASE 56 37 - 153 U/L    AST 14 10 - 35 U/L    ALT 8 6 - 29 U/L         Assessment/Plan     89 y.o. female with medical history significant for CKD, HTN, anemia, and dementia who presents with the accompany of her daughter (POA per daughter's report) for an initial evaluation for CKD. The daughter provided majority of the history.    # CKD4: baseline sCr 1.8 - 2.1 mg/dL with eGFR 22 - 26 ml/min/1.73m2 since at least 2024. No recent UA or urine spot test. CT A/P (22) showed \"unenhanced left kidney appears normal; there are regions of atrophy involving the upper and lateral aspect of the right kidney; multiple right renal calcifications with the largest calcification in the lower right kidney 4.7 mm; 1.1 cm hyperdense lesion along the posterior mid right kidney suggestive of a hyperdense cyst. Likely due to hypertensive kidney disease and age-related nephrons loss. Possible chronic dehydration 2/2 inadequate oral fluids intake may also contribute to it. Most recent sCr 1.79 mg/dL with eGFR 26 ml/min/1.73m2 (25).    # HTN: BP well controlled with current monotherapy Benicar. However, may consider to switch to amlodipine for BP control given her advanced CKD.     # Anemia: iron deficiency anemia. Advanced CKD can also contribute to it. May refer to hematology for a full work-up. Patient and family would like to further discuss it in next visit.    Plan:  - Labs.  - Renal US.  - Continue to follow up with PCP for optimal HTN management. Goal /80 mmHg for renal protection.   - Encouraged daily adequate oral fluids intake.  - No changes with current medications.   - Reviewed strategies for preserving remaining kidney function includin) Avoidance of NSAIDs including Aleve (Naprosyn), Motrin (Ibuprofen), Mobic (Meloxicam), Celebrex (Celecoxib) and " aspirin as well as PPI acid blocking medications such as Prilosec (omeprazole), Protonix (pantoprazole), and Nexium (esomeprazole), as well as other nephrotoxic agents.    2) Avoidance of tobacco or alcohol use.    3) Adequate hydration daily.    4) Blood pressure target 130/80 mmHg.    5) Daily dietary sodium intake less than 2 grams per day.     6) Maintain healthy lifestyle. Healthy diet and regular exercises.    7) Maintain ideal weight.   - FUV: in 1-2 months or sooner if concerns arise.      Patient and her daughter verbalized understanding of above. They will not hesitate to contact Division of Nephrology at [phone] with concerns/questions.     I spent 45 minutes in the professional and overall care of this patient.      Kateryna Howell, BERTHA-CNP

## 2025-03-11 ENCOUNTER — APPOINTMENT (OUTPATIENT)
Dept: RADIOLOGY | Facility: CLINIC | Age: OVER 89
End: 2025-03-11
Payer: MEDICARE

## 2025-03-13 ENCOUNTER — HOSPITAL ENCOUNTER (OUTPATIENT)
Dept: RADIOLOGY | Facility: CLINIC | Age: OVER 89
Discharge: HOME | End: 2025-03-13
Payer: MEDICARE

## 2025-03-13 PROCEDURE — 76770 US EXAM ABDO BACK WALL COMP: CPT

## 2025-04-09 ENCOUNTER — APPOINTMENT (OUTPATIENT)
Dept: GERIATRIC MEDICINE | Facility: CLINIC | Age: OVER 89
End: 2025-04-09
Payer: MEDICARE

## 2025-04-15 DIAGNOSIS — E61.1 IRON DEFICIENCY: ICD-10-CM

## 2025-04-15 DIAGNOSIS — I10 ESSENTIAL HYPERTENSION: ICD-10-CM

## 2025-04-15 RX ORDER — FERROUS SULFATE 325(65) MG
TABLET, DELAYED RELEASE (ENTERIC COATED) ORAL
Qty: 90 TABLET | Refills: 0 | Status: SHIPPED | OUTPATIENT
Start: 2025-04-15

## 2025-04-15 RX ORDER — OLMESARTAN MEDOXOMIL-HYDROCHLOROTHIAZIDE 20; 12.5 MG/1; MG/1
1 TABLET, FILM COATED ORAL DAILY
Qty: 90 TABLET | Refills: 0 | Status: SHIPPED | OUTPATIENT
Start: 2025-04-15 | End: 2025-10-12

## 2025-04-16 RX ORDER — OLMESARTAN MEDOXOMIL-HYDROCHLOROTHIAZIDE 20; 12.5 MG/1; MG/1
1 TABLET, FILM COATED ORAL DAILY
Qty: 90 TABLET | Refills: 0 | OUTPATIENT
Start: 2025-04-16 | End: 2025-10-13

## 2025-05-02 ENCOUNTER — APPOINTMENT (OUTPATIENT)
Dept: NEPHROLOGY | Facility: CLINIC | Age: OVER 89
End: 2025-05-02
Payer: MEDICARE

## 2025-05-02 VITALS
OXYGEN SATURATION: 97 % | BODY MASS INDEX: 22.13 KG/M2 | DIASTOLIC BLOOD PRESSURE: 64 MMHG | HEART RATE: 77 BPM | WEIGHT: 133 LBS | TEMPERATURE: 97.7 F | SYSTOLIC BLOOD PRESSURE: 104 MMHG

## 2025-05-02 DIAGNOSIS — I10 ESSENTIAL HYPERTENSION: ICD-10-CM

## 2025-05-02 DIAGNOSIS — N18.4 CHRONIC KIDNEY DISEASE, STAGE 4 (SEVERE) (MULTI): ICD-10-CM

## 2025-05-02 DIAGNOSIS — D64.9 ANEMIA, UNSPECIFIED TYPE: Primary | ICD-10-CM

## 2025-05-02 PROCEDURE — 3074F SYST BP LT 130 MM HG: CPT | Performed by: NURSE PRACTITIONER

## 2025-05-02 PROCEDURE — 3078F DIAST BP <80 MM HG: CPT | Performed by: NURSE PRACTITIONER

## 2025-05-02 PROCEDURE — 99213 OFFICE O/P EST LOW 20 MIN: CPT | Performed by: NURSE PRACTITIONER

## 2025-05-02 ASSESSMENT — ENCOUNTER SYMPTOMS
ENDOCRINE NEGATIVE: 1
CONSTITUTIONAL NEGATIVE: 1
HEMATOLOGIC/LYMPHATIC NEGATIVE: 1
RESPIRATORY NEGATIVE: 1
EYES NEGATIVE: 1
PSYCHIATRIC NEGATIVE: 1
CARDIOVASCULAR NEGATIVE: 1
NEUROLOGICAL NEGATIVE: 1
MUSCULOSKELETAL NEGATIVE: 1
GASTROINTESTINAL NEGATIVE: 1

## 2025-05-02 NOTE — PROGRESS NOTES
History Of Present Illness  Umu Mckeon is a 89 y.o. female with medical history significant for CKD4, HTN, anemia, and dementia who presents with the accompany of her daughter (POA per daughter's report) and granddaughter for a 2-month fuv for CKD. The daughter provided majority of the history.     Past Medical History  As above.    Surgical History  She has a past surgical history that includes Other surgical history (11/20/2020); Other surgical history (11/20/2020); and Other surgical history (11/20/2020).     Social History  She reports that she quit smoking about 31 years ago. Her smoking use included cigarettes. She has been exposed to tobacco smoke. She has never used smokeless tobacco. She reports that she does not currently use alcohol. She reports that she does not currently use drugs.    Family History  No family history on file.     Allergies  Aspirin, Butorphanol, Coconut, Coconut oil, Codeine, Cranberry, Diltiazem, Diltiazem hcl, Grape, Hydralazine, Iodinated contrast media, Metoprolol, Paroxetine hcl, Sertraline, and Sulfa (sulfonamide antibiotics)    Review of Systems   Constitutional: Negative.    HENT:  Negative for hearing loss.    Eyes: Negative.    Respiratory: Negative.     Cardiovascular: Negative.    Gastrointestinal: Negative.    Endocrine: Negative.    Genitourinary: Negative.    Musculoskeletal: Negative.    Skin: Negative.    Neurological: Negative.    Hematological: Negative.    Psychiatric/Behavioral: Negative.          Physical Exam  Vitals reviewed.   Constitutional:       Appearance: Normal appearance.   HENT:      Mouth/Throat:      Mouth: Mucous membranes are dry.   Cardiovascular:      Rate and Rhythm: Normal rate and regular rhythm.      Pulses: Normal pulses.      Heart sounds: Normal heart sounds.   Pulmonary:      Effort: Pulmonary effort is normal.      Breath sounds: Normal breath sounds.   Musculoskeletal:         General: Normal range of motion.   Skin:     General: Skin  is warm and dry.   Neurological:      Mental Status: She is alert. Mental status is at baseline.   Psychiatric:         Mood and Affect: Mood normal.         Behavior: Behavior normal.          Last Recorded Vitals  Blood pressure 104/64, pulse 77, temperature 36.5 °C (97.7 °F), temperature source Temporal, weight 60.3 kg (133 lb), SpO2 97%.    Relevant Results  Recent Results (from the past 15 weeks)   CBC and Auto Differential    Collection Time: 02/26/25  2:44 PM   Result Value Ref Range    WHITE BLOOD CELL COUNT 5.4 3.8 - 10.8 Thousand/uL    RED BLOOD CELL COUNT 3.07 (L) 3.80 - 5.10 Million/uL    HEMOGLOBIN 8.7 (L) 11.7 - 15.5 g/dL    HEMATOCRIT 28.5 (L) 35.0 - 45.0 %    MCV 92.8 80.0 - 100.0 fL    MCH 28.3 27.0 - 33.0 pg    MCHC 30.5 (L) 32.0 - 36.0 g/dL    RDW 12.6 11.0 - 15.0 %    PLATELET COUNT 176 140 - 400 Thousand/uL    MPV 11.3 7.5 - 12.5 fL    ABSOLUTE NEUTROPHILS 3,483 1,500 - 7,800 cells/uL    ABSOLUTE LYMPHOCYTES 1,393 850 - 3,900 cells/uL    ABSOLUTE MONOCYTES 481 200 - 950 cells/uL    ABSOLUTE EOSINOPHILS 22 15 - 500 cells/uL    ABSOLUTE BASOPHILS 22 0 - 200 cells/uL    NEUTROPHILS 64.5 %    LYMPHOCYTES 25.8 %    MONOCYTES 8.9 %    EOSINOPHILS 0.4 %    BASOPHILS 0.4 %   Ferritin    Collection Time: 02/26/25  2:44 PM   Result Value Ref Range    FERRITIN 313 (H) 16 - 288 ng/mL   Iron and TIBC    Collection Time: 02/26/25  2:44 PM   Result Value Ref Range    IRON, TOTAL 80 45 - 160 mcg/dL    IRON BINDING CAPACITY 207 (L) 250 - 450 mcg/dL (calc)    % SATURATION 39 16 - 45 % (calc)   Comprehensive Metabolic Panel    Collection Time: 02/26/25  2:44 PM   Result Value Ref Range    GLUCOSE 102 65 - 139 mg/dL    UREA NITROGEN (BUN) 35 (H) 7 - 25 mg/dL    CREATININE 1.79 (H) 0.60 - 0.95 mg/dL    EGFR 26 (L) > OR = 60 mL/min/1.73m2    SODIUM 141 135 - 146 mmol/L    POTASSIUM 4.0 3.5 - 5.3 mmol/L    CHLORIDE 109 98 - 110 mmol/L    CARBON DIOXIDE 25 20 - 32 mmol/L    ELECTROLYTE BALANCE 7 7 - 17 mmol/L (calc)     "CALCIUM 9.0 8.6 - 10.4 mg/dL    PROTEIN, TOTAL 6.4 6.1 - 8.1 g/dL    ALBUMIN 3.8 3.6 - 5.1 g/dL    BILIRUBIN, TOTAL 0.5 0.2 - 1.2 mg/dL    ALKALINE PHOSPHATASE 56 37 - 153 U/L    AST 14 10 - 35 U/L    ALT 8 6 - 29 U/L            Assessment/Plan     89 y.o. female with medical history significant for CKD4, HTN, anemia, and dementia who presents with the accompany of her daughter (POA per daughter's report) and granddaughter for a 2-month fuv for CKD. The daughter provided majority of the history.    # CKD4: baseline sCr 1.8 - 2.1 mg/dL with eGFR 22 - 26 ml/min/1.73m2 since at least October 2024. No recent UA or urine spot test. Renal US (3/13/25) showed smaller-sized kidneys - right kidney measuring 8.1 cm in length and left kidney measuring 9.6 cm in length, increased echogenicity of the bilateral kidneys in consistence with medical renal disease, simple bilateral renal cysts, and nonobstructing 2 mm right renal calculi. CT A/P (9/13/22) showed \"unenhanced left kidney appears normal; there are regions of atrophy involving the upper and lateral aspect of the right kidney; multiple right renal calcifications with the largest calcification in the lower right kidney 4.7 mm; 1.1 cm hyperdense lesion along the posterior mid right kidney suggestive of a hyperdense cyst. Likely due to hypertensive kidney disease and age-related nephrons loss. Possible chronic dehydration 2/2 inadequate oral fluids intake may also contribute to it. Most recent sCr 1.79 mg/dL with eGFR 26 ml/min/1.73m2 (2/26/25) - at baseline. Currently stable.    # HTN: BP well controlled with current monotherapy Benicar.     # Anemia: iron deficiency anemia. Advanced CKD may also contribute to it. May refer to hematology for a full work-up.     Plan:  - Referred to [facility] program given advanced CKD.  - Continue to follow up with PCP for optimal HTN management. Goal /80 mmHg for renal protection.   - Encouraged daily adequate oral " fluids intake.  - No changes with current medications.   - Reviewed strategies for preserving remaining kidney function includin) Avoidance of NSAIDs including Aleve (Naprosyn), Motrin (Ibuprofen), Mobic (Meloxicam), Celebrex (Celecoxib) and aspirin as well as PPI acid blocking medications such as Prilosec (omeprazole), Protonix (pantoprazole), and Nexium (esomeprazole), as well as other nephrotoxic agents.    2) Avoidance of tobacco or alcohol use.    3) Adequate hydration daily.    4) Blood pressure target 130/80 mmHg.    5) Daily dietary sodium intake less than 2 grams per day.     6) Maintain healthy lifestyle. Healthy diet and regular exercises.    7) Maintain ideal weight.   - FUV: in 1-2 months or sooner if concerns arise, at [facility] clinic.      Patient and her family verbalized understanding of above. They will not hesitate to contact Division of Nephrology at [phone] with concerns/questions.     I spent 30 minutes in the professional and overall care of this patient.      Kateryna Howell, BERTHA-CNP

## 2025-05-12 DIAGNOSIS — N18.4 CHRONIC KIDNEY DISEASE, STAGE 4 (SEVERE) (MULTI): Primary | ICD-10-CM

## 2025-06-16 ENCOUNTER — TELEPHONE (OUTPATIENT)
Dept: PRIMARY CARE | Facility: CLINIC | Age: OVER 89
End: 2025-06-16
Payer: MEDICARE

## 2025-06-16 DIAGNOSIS — K21.9 GASTROESOPHAGEAL REFLUX DISEASE WITHOUT ESOPHAGITIS: ICD-10-CM

## 2025-06-16 RX ORDER — PANTOPRAZOLE SODIUM 40 MG/1
40 TABLET, DELAYED RELEASE ORAL DAILY
Qty: 90 TABLET | Refills: 0 | Status: SHIPPED | OUTPATIENT
Start: 2025-06-16

## 2025-06-16 NOTE — TELEPHONE ENCOUNTER
Patient is requesting a refill on:    pantoprazole (ProtoNix) 40 mg EC tablet   Route: TAKE ONE TABLET BY MOUTH ONCE DAILY. DO NOT CRUSH, CHEW, OR SPLIT.     Pharmacy:  [facility]     Last office visit:  2/26/2025    Next office visit: No future apt

## 2025-06-18 ENCOUNTER — APPOINTMENT (OUTPATIENT)
Dept: GERIATRIC MEDICINE | Facility: CLINIC | Age: OVER 89
End: 2025-06-18
Payer: MEDICARE

## 2025-07-08 DIAGNOSIS — I10 ESSENTIAL HYPERTENSION: ICD-10-CM

## 2025-07-08 DIAGNOSIS — E61.1 IRON DEFICIENCY: ICD-10-CM

## 2025-07-09 RX ORDER — FERROUS SULFATE 325(65) MG
TABLET, DELAYED RELEASE (ENTERIC COATED) ORAL
Qty: 90 TABLET | Refills: 0 | Status: SHIPPED | OUTPATIENT
Start: 2025-07-09

## 2025-07-09 RX ORDER — OLMESARTAN MEDOXOMIL-HYDROCHLOROTHIAZIDE 20; 12.5 MG/1; MG/1
1 TABLET, FILM COATED ORAL DAILY
Qty: 90 TABLET | Refills: 0 | Status: SHIPPED | OUTPATIENT
Start: 2025-07-09

## 2025-07-14 ENCOUNTER — TELEPHONE (OUTPATIENT)
Dept: PRIMARY CARE | Facility: CLINIC | Age: OVER 89
End: 2025-07-14
Payer: MEDICARE

## 2025-08-05 ENCOUNTER — TELEPHONE (OUTPATIENT)
Dept: PRIMARY CARE | Facility: CLINIC | Age: OVER 89
End: 2025-08-05

## 2025-08-05 ENCOUNTER — APPOINTMENT (OUTPATIENT)
Dept: NEPHROLOGY | Facility: CLINIC | Age: OVER 89
End: 2025-08-05
Payer: MEDICARE

## 2025-08-05 NOTE — TELEPHONE ENCOUNTER
----- Message from Gabriela Palmer sent at 7/9/2025 10:27 AM EDT -----  Could we please contact the patient's daughter Nella to see if she is going to continue to follow with me or establish with a new PCP?

## 2025-08-25 ENCOUNTER — APPOINTMENT (OUTPATIENT)
Dept: PRIMARY CARE | Facility: CLINIC | Age: OVER 89
End: 2025-08-25
Payer: MEDICARE

## 2025-09-03 ENCOUNTER — APPOINTMENT (OUTPATIENT)
Dept: PRIMARY CARE | Facility: CLINIC | Age: OVER 89
End: 2025-09-03
Payer: MEDICARE

## 2025-09-04 LAB
25(OH)D3+25(OH)D2 SERPL-MCNC: 22 NG/ML (ref 30–100)
ALBUMIN SERPL-MCNC: 3.9 G/DL (ref 3.6–5.1)
ALBUMIN/CREAT UR: 3 MG/G CREAT
BUN SERPL-MCNC: 37 MG/DL (ref 7–25)
BUN/CREAT SERPL: 19 (CALC) (ref 6–22)
CALCIUM SERPL-MCNC: 9.1 MG/DL (ref 8.6–10.4)
CHLORIDE SERPL-SCNC: 109 MMOL/L (ref 98–110)
CO2 SERPL-SCNC: 25 MMOL/L (ref 20–32)
CREAT SERPL-MCNC: 1.99 MG/DL (ref 0.6–0.95)
CREAT UR-MCNC: 116 MG/DL (ref 20–275)
EGFRCR SERPLBLD CKD-EPI 2021: 23 ML/MIN/1.73M2
GLUCOSE SERPL-MCNC: 99 MG/DL (ref 65–139)
MICROALBUMIN UR-MCNC: 0.4 MG/DL
PHOSPHATE SERPL-MCNC: 3 MG/DL (ref 2.1–4.3)
POTASSIUM SERPL-SCNC: 4.5 MMOL/L (ref 3.5–5.3)
PTH-INTACT SERPL-MCNC: 89 PG/ML (ref 16–77)
SODIUM SERPL-SCNC: 141 MMOL/L (ref 135–146)

## 2025-10-21 ENCOUNTER — APPOINTMENT (OUTPATIENT)
Dept: PRIMARY CARE | Facility: CLINIC | Age: OVER 89
End: 2025-10-21
Payer: MEDICARE

## 2025-12-03 ENCOUNTER — APPOINTMENT (OUTPATIENT)
Dept: PRIMARY CARE | Facility: CLINIC | Age: OVER 89
End: 2025-12-03
Payer: MEDICARE